# Patient Record
Sex: FEMALE | Race: WHITE | NOT HISPANIC OR LATINO
[De-identification: names, ages, dates, MRNs, and addresses within clinical notes are randomized per-mention and may not be internally consistent; named-entity substitution may affect disease eponyms.]

---

## 2017-01-25 ENCOUNTER — FORM ENCOUNTER (OUTPATIENT)
Age: 82
End: 2017-01-25

## 2017-01-26 ENCOUNTER — APPOINTMENT (OUTPATIENT)
Dept: ULTRASOUND IMAGING | Facility: CLINIC | Age: 82
End: 2017-01-26

## 2017-01-26 ENCOUNTER — OUTPATIENT (OUTPATIENT)
Dept: OUTPATIENT SERVICES | Facility: HOSPITAL | Age: 82
LOS: 1 days | End: 2017-01-26
Payer: MEDICARE

## 2017-01-26 ENCOUNTER — APPOINTMENT (OUTPATIENT)
Dept: INTERNAL MEDICINE | Facility: CLINIC | Age: 82
End: 2017-01-26

## 2017-01-26 VITALS — HEART RATE: 74 BPM | DIASTOLIC BLOOD PRESSURE: 70 MMHG | SYSTOLIC BLOOD PRESSURE: 124 MMHG

## 2017-01-26 VITALS — SYSTOLIC BLOOD PRESSURE: 136 MMHG | DIASTOLIC BLOOD PRESSURE: 76 MMHG | HEART RATE: 78 BPM

## 2017-01-26 VITALS
TEMPERATURE: 97.6 F | HEIGHT: 60 IN | SYSTOLIC BLOOD PRESSURE: 124 MMHG | DIASTOLIC BLOOD PRESSURE: 62 MMHG | BODY MASS INDEX: 38.09 KG/M2 | OXYGEN SATURATION: 97 % | HEART RATE: 78 BPM | WEIGHT: 194 LBS

## 2017-01-26 DIAGNOSIS — Z96.659 PRESENCE OF UNSPECIFIED ARTIFICIAL KNEE JOINT: Chronic | ICD-10-CM

## 2017-01-26 DIAGNOSIS — H26.9 UNSPECIFIED CATARACT: Chronic | ICD-10-CM

## 2017-01-26 DIAGNOSIS — Z96.60 PRESENCE OF UNSPECIFIED ORTHOPEDIC JOINT IMPLANT: Chronic | ICD-10-CM

## 2017-01-26 DIAGNOSIS — G56.00 CARPAL TUNNEL SYNDROME, UNSPECIFIED UPPER LIMB: Chronic | ICD-10-CM

## 2017-01-26 DIAGNOSIS — R60.9 EDEMA, UNSPECIFIED: ICD-10-CM

## 2017-01-26 PROCEDURE — 93971 EXTREMITY STUDY: CPT

## 2017-01-26 RX ORDER — MULTIVIT-MIN/FOLIC/VIT K/LYCOP 400-300MCG
25 MCG TABLET ORAL DAILY
Qty: 90 | Refills: 3 | Status: ACTIVE | COMMUNITY

## 2017-02-06 ENCOUNTER — MEDICATION RENEWAL (OUTPATIENT)
Age: 82
End: 2017-02-06

## 2017-04-28 ENCOUNTER — RX RENEWAL (OUTPATIENT)
Age: 82
End: 2017-04-28

## 2017-04-28 LAB
ALBUMIN SERPL ELPH-MCNC: 4.1 G/DL
ALP BLD-CCNC: 90 U/L
ALT SERPL-CCNC: 14 U/L
ANION GAP SERPL CALC-SCNC: 18 MMOL/L
AST SERPL-CCNC: 21 U/L
BASOPHILS # BLD AUTO: 0.06 K/UL
BASOPHILS NFR BLD AUTO: 1.1 %
BILIRUB SERPL-MCNC: 0.3 MG/DL
BUN SERPL-MCNC: 29 MG/DL
CALCIUM SERPL-MCNC: 10.2 MG/DL
CHLORIDE SERPL-SCNC: 99 MMOL/L
CO2 SERPL-SCNC: 23 MMOL/L
CREAT SERPL-MCNC: 0.96 MG/DL
EOSINOPHIL # BLD AUTO: 0.19 K/UL
EOSINOPHIL NFR BLD AUTO: 3.4 %
GLUCOSE SERPL-MCNC: 91 MG/DL
HCT VFR BLD CALC: 41.5 %
HGB BLD-MCNC: 13.1 G/DL
IMM GRANULOCYTES NFR BLD AUTO: 0.2 %
LYMPHOCYTES # BLD AUTO: 1.64 K/UL
LYMPHOCYTES NFR BLD AUTO: 29.7 %
MAN DIFF?: NORMAL
MCHC RBC-ENTMCNC: 29.3 PG
MCHC RBC-ENTMCNC: 31.6 GM/DL
MCV RBC AUTO: 92.8 FL
MONOCYTES # BLD AUTO: 0.52 K/UL
MONOCYTES NFR BLD AUTO: 9.4 %
NEUTROPHILS # BLD AUTO: 3.1 K/UL
NEUTROPHILS NFR BLD AUTO: 56.2 %
PLATELET # BLD AUTO: 332 K/UL
POTASSIUM SERPL-SCNC: 4 MMOL/L
PROT SERPL-MCNC: 7.2 G/DL
RBC # BLD: 4.47 M/UL
RBC # FLD: 14.3 %
SODIUM SERPL-SCNC: 140 MMOL/L
WBC # FLD AUTO: 5.52 K/UL

## 2017-05-03 ENCOUNTER — NON-APPOINTMENT (OUTPATIENT)
Age: 82
End: 2017-05-03

## 2017-05-03 ENCOUNTER — APPOINTMENT (OUTPATIENT)
Dept: CARDIOLOGY | Facility: CLINIC | Age: 82
End: 2017-05-03

## 2017-05-03 VITALS
TEMPERATURE: 97.5 F | HEART RATE: 84 BPM | SYSTOLIC BLOOD PRESSURE: 123 MMHG | DIASTOLIC BLOOD PRESSURE: 78 MMHG | WEIGHT: 196 LBS | BODY MASS INDEX: 38.48 KG/M2 | OXYGEN SATURATION: 96 % | HEIGHT: 60 IN

## 2017-05-05 LAB
ALBUMIN SERPL ELPH-MCNC: 4 G/DL
ALP BLD-CCNC: 88 U/L
ALT SERPL-CCNC: 17 U/L
ANION GAP SERPL CALC-SCNC: 18 MMOL/L
AST SERPL-CCNC: 24 U/L
BASOPHILS # BLD AUTO: 0.08 K/UL
BASOPHILS NFR BLD AUTO: 1.2 %
BILIRUB SERPL-MCNC: 0.4 MG/DL
BUN SERPL-MCNC: 34 MG/DL
CALCIUM SERPL-MCNC: 10 MG/DL
CHLORIDE SERPL-SCNC: 100 MMOL/L
CHOLEST SERPL-MCNC: 236 MG/DL
CHOLEST/HDLC SERPL: 2.6 RATIO
CK SERPL-CCNC: 205 U/L
CO2 SERPL-SCNC: 22 MMOL/L
CREAT SERPL-MCNC: 1.31 MG/DL
EOSINOPHIL # BLD AUTO: 0.22 K/UL
EOSINOPHIL NFR BLD AUTO: 3.3 %
GLUCOSE SERPL-MCNC: 96 MG/DL
HBA1C MFR BLD HPLC: 5.8 %
HCT VFR BLD CALC: 41 %
HDLC SERPL-MCNC: 90 MG/DL
HGB BLD-MCNC: 13.9 G/DL
IMM GRANULOCYTES NFR BLD AUTO: 0.2 %
LDLC SERPL CALC-MCNC: 133 MG/DL
LYMPHOCYTES # BLD AUTO: 1.95 K/UL
LYMPHOCYTES NFR BLD AUTO: 29.7 %
MAN DIFF?: NORMAL
MCHC RBC-ENTMCNC: 30 PG
MCHC RBC-ENTMCNC: 33.9 GM/DL
MCV RBC AUTO: 88.4 FL
MONOCYTES # BLD AUTO: 0.54 K/UL
MONOCYTES NFR BLD AUTO: 8.2 %
NEUTROPHILS # BLD AUTO: 3.77 K/UL
NEUTROPHILS NFR BLD AUTO: 57.4 %
NT-PROBNP SERPL-MCNC: 43 PG/ML
PLATELET # BLD AUTO: 282 K/UL
POTASSIUM SERPL-SCNC: 3.6 MMOL/L
PROT SERPL-MCNC: 7.3 G/DL
RBC # BLD: 4.64 M/UL
RBC # FLD: 17.6 %
SODIUM SERPL-SCNC: 140 MMOL/L
TRIGL SERPL-MCNC: 65 MG/DL
WBC # FLD AUTO: 6.57 K/UL

## 2017-06-05 ENCOUNTER — APPOINTMENT (OUTPATIENT)
Dept: MRI IMAGING | Facility: CLINIC | Age: 82
End: 2017-06-05

## 2017-06-05 ENCOUNTER — OUTPATIENT (OUTPATIENT)
Dept: OUTPATIENT SERVICES | Facility: HOSPITAL | Age: 82
LOS: 1 days | End: 2017-06-05
Payer: MEDICARE

## 2017-06-05 DIAGNOSIS — Z00.8 ENCOUNTER FOR OTHER GENERAL EXAMINATION: ICD-10-CM

## 2017-06-05 DIAGNOSIS — G56.00 CARPAL TUNNEL SYNDROME, UNSPECIFIED UPPER LIMB: Chronic | ICD-10-CM

## 2017-06-05 DIAGNOSIS — Z96.659 PRESENCE OF UNSPECIFIED ARTIFICIAL KNEE JOINT: Chronic | ICD-10-CM

## 2017-06-05 DIAGNOSIS — H26.9 UNSPECIFIED CATARACT: Chronic | ICD-10-CM

## 2017-06-05 DIAGNOSIS — Z96.60 PRESENCE OF UNSPECIFIED ORTHOPEDIC JOINT IMPLANT: Chronic | ICD-10-CM

## 2017-06-05 PROCEDURE — 74183 MRI ABD W/O CNTR FLWD CNTR: CPT

## 2017-06-05 PROCEDURE — A9585: CPT

## 2017-06-28 ENCOUNTER — APPOINTMENT (OUTPATIENT)
Dept: OTOLARYNGOLOGY | Facility: CLINIC | Age: 82
End: 2017-06-28

## 2017-06-28 VITALS
HEIGHT: 62 IN | BODY MASS INDEX: 34.96 KG/M2 | WEIGHT: 190 LBS | DIASTOLIC BLOOD PRESSURE: 76 MMHG | HEART RATE: 93 BPM | SYSTOLIC BLOOD PRESSURE: 123 MMHG

## 2017-07-12 ENCOUNTER — MEDICATION RENEWAL (OUTPATIENT)
Age: 82
End: 2017-07-12

## 2017-08-08 ENCOUNTER — MEDICATION RENEWAL (OUTPATIENT)
Age: 82
End: 2017-08-08

## 2017-09-13 ENCOUNTER — APPOINTMENT (OUTPATIENT)
Dept: CARDIOLOGY | Facility: CLINIC | Age: 82
End: 2017-09-13
Payer: MEDICARE

## 2017-09-13 ENCOUNTER — NON-APPOINTMENT (OUTPATIENT)
Age: 82
End: 2017-09-13

## 2017-09-13 VITALS
DIASTOLIC BLOOD PRESSURE: 73 MMHG | WEIGHT: 205 LBS | HEART RATE: 84 BPM | SYSTOLIC BLOOD PRESSURE: 121 MMHG | BODY MASS INDEX: 37.5 KG/M2 | OXYGEN SATURATION: 95 %

## 2017-09-13 DIAGNOSIS — R73.09 OTHER ABNORMAL GLUCOSE: ICD-10-CM

## 2017-09-13 PROCEDURE — 99214 OFFICE O/P EST MOD 30 MIN: CPT

## 2017-09-13 PROCEDURE — 36415 COLL VENOUS BLD VENIPUNCTURE: CPT

## 2017-09-13 PROCEDURE — 93000 ELECTROCARDIOGRAM COMPLETE: CPT

## 2017-09-15 ENCOUNTER — RX RENEWAL (OUTPATIENT)
Age: 82
End: 2017-09-15

## 2017-09-15 LAB
25(OH)D3 SERPL-MCNC: 35.6 NG/ML
ALBUMIN SERPL ELPH-MCNC: 3.8 G/DL
ALP BLD-CCNC: 91 U/L
ALT SERPL-CCNC: 15 U/L
ANION GAP SERPL CALC-SCNC: 23 MMOL/L
AST SERPL-CCNC: 24 U/L
BASOPHILS # BLD AUTO: 0.05 K/UL
BASOPHILS NFR BLD AUTO: 0.7 %
BILIRUB SERPL-MCNC: 0.3 MG/DL
BUN SERPL-MCNC: 40 MG/DL
CALCIUM SERPL-MCNC: 9.9 MG/DL
CHLORIDE SERPL-SCNC: 101 MMOL/L
CHOLEST SERPL-MCNC: 214 MG/DL
CHOLEST/HDLC SERPL: 3.2 RATIO
CO2 SERPL-SCNC: 18 MMOL/L
CREAT SERPL-MCNC: 1.01 MG/DL
EOSINOPHIL # BLD AUTO: 0.26 K/UL
EOSINOPHIL NFR BLD AUTO: 3.6 %
GLUCOSE SERPL-MCNC: 102 MG/DL
HBA1C MFR BLD HPLC: 5.6 %
HCT VFR BLD CALC: 40.1 %
HDLC SERPL-MCNC: 66 MG/DL
HGB BLD-MCNC: 13.2 G/DL
IMM GRANULOCYTES NFR BLD AUTO: 0.1 %
LDLC SERPL CALC-MCNC: 131 MG/DL
LYMPHOCYTES # BLD AUTO: 1.66 K/UL
LYMPHOCYTES NFR BLD AUTO: 22.7 %
MAN DIFF?: NORMAL
MCHC RBC-ENTMCNC: 30 PG
MCHC RBC-ENTMCNC: 32.9 GM/DL
MCV RBC AUTO: 91.1 FL
MONOCYTES # BLD AUTO: 0.78 K/UL
MONOCYTES NFR BLD AUTO: 10.7 %
NEUTROPHILS # BLD AUTO: 4.55 K/UL
NEUTROPHILS NFR BLD AUTO: 62.2 %
NT-PROBNP SERPL-MCNC: 37 PG/ML
PLATELET # BLD AUTO: 287 K/UL
POTASSIUM SERPL-SCNC: 3.5 MMOL/L
PROT SERPL-MCNC: 6.9 G/DL
RBC # BLD: 4.4 M/UL
RBC # FLD: 14.7 %
SODIUM SERPL-SCNC: 142 MMOL/L
TRIGL SERPL-MCNC: 85 MG/DL
TSH SERPL-ACNC: 1.86 UIU/ML
WBC # FLD AUTO: 7.31 K/UL

## 2017-11-06 ENCOUNTER — APPOINTMENT (OUTPATIENT)
Dept: INTERNAL MEDICINE | Facility: CLINIC | Age: 82
End: 2017-11-06
Payer: MEDICARE

## 2017-11-06 VITALS
TEMPERATURE: 98.2 F | HEIGHT: 62 IN | BODY MASS INDEX: 37.13 KG/M2 | OXYGEN SATURATION: 98 % | HEART RATE: 88 BPM | WEIGHT: 201.8 LBS | SYSTOLIC BLOOD PRESSURE: 120 MMHG | DIASTOLIC BLOOD PRESSURE: 60 MMHG

## 2017-11-06 PROCEDURE — G0439: CPT

## 2017-11-06 RX ORDER — AMOXICILLIN 500 MG/1
500 CAPSULE ORAL
Qty: 28 | Refills: 0 | Status: COMPLETED | COMMUNITY
Start: 2017-01-03 | End: 2017-11-06

## 2017-12-06 ENCOUNTER — APPOINTMENT (OUTPATIENT)
Dept: INTERNAL MEDICINE | Facility: CLINIC | Age: 82
End: 2017-12-06
Payer: MEDICARE

## 2017-12-06 ENCOUNTER — NON-APPOINTMENT (OUTPATIENT)
Age: 82
End: 2017-12-06

## 2017-12-06 VITALS
TEMPERATURE: 98.5 F | BODY MASS INDEX: 36.99 KG/M2 | RESPIRATION RATE: 14 BRPM | SYSTOLIC BLOOD PRESSURE: 128 MMHG | WEIGHT: 201 LBS | HEART RATE: 95 BPM | HEIGHT: 62 IN | OXYGEN SATURATION: 96 % | DIASTOLIC BLOOD PRESSURE: 70 MMHG

## 2017-12-06 DIAGNOSIS — R07.9 CHEST PAIN, UNSPECIFIED: ICD-10-CM

## 2017-12-06 LAB
APPEARANCE: ABNORMAL
BACTERIA: ABNORMAL
BILIRUBIN URINE: NEGATIVE
BLOOD URINE: NEGATIVE
COLOR: YELLOW
GLUCOSE QUALITATIVE U: NEGATIVE MG/DL
HYALINE CASTS: 0 /LPF
KETONES URINE: ABNORMAL
LEUKOCYTE ESTERASE URINE: ABNORMAL
MICROSCOPIC-UA: NORMAL
NITRITE URINE: POSITIVE
PH URINE: 7.5
PROTEIN URINE: NEGATIVE MG/DL
RED BLOOD CELLS URINE: 3 /HPF
SPECIFIC GRAVITY URINE: 1.03
SQUAMOUS EPITHELIAL CELLS: >27 /HPF
UROBILINOGEN URINE: 1 MG/DL
WHITE BLOOD CELLS URINE: 12 /HPF

## 2017-12-06 PROCEDURE — 99214 OFFICE O/P EST MOD 30 MIN: CPT

## 2017-12-06 PROCEDURE — 93000 ELECTROCARDIOGRAM COMPLETE: CPT

## 2017-12-07 LAB — RAPID RVP RESULT: NOT DETECTED

## 2017-12-11 ENCOUNTER — MESSAGE (OUTPATIENT)
Age: 82
End: 2017-12-11

## 2017-12-26 ENCOUNTER — MESSAGE (OUTPATIENT)
Age: 82
End: 2017-12-26

## 2018-01-02 ENCOUNTER — APPOINTMENT (OUTPATIENT)
Dept: INTERNAL MEDICINE | Facility: CLINIC | Age: 83
End: 2018-01-02

## 2018-03-12 ENCOUNTER — NON-APPOINTMENT (OUTPATIENT)
Age: 83
End: 2018-03-12

## 2018-03-12 ENCOUNTER — APPOINTMENT (OUTPATIENT)
Dept: CARDIOLOGY | Facility: CLINIC | Age: 83
End: 2018-03-12
Payer: MEDICARE

## 2018-03-12 VITALS
TEMPERATURE: 97.8 F | OXYGEN SATURATION: 96 % | WEIGHT: 209 LBS | HEART RATE: 87 BPM | SYSTOLIC BLOOD PRESSURE: 152 MMHG | HEIGHT: 62 IN | BODY MASS INDEX: 38.46 KG/M2 | DIASTOLIC BLOOD PRESSURE: 83 MMHG

## 2018-03-12 VITALS — SYSTOLIC BLOOD PRESSURE: 128 MMHG | DIASTOLIC BLOOD PRESSURE: 66 MMHG | HEART RATE: 80 BPM

## 2018-03-12 PROCEDURE — 93000 ELECTROCARDIOGRAM COMPLETE: CPT

## 2018-03-12 PROCEDURE — 99214 OFFICE O/P EST MOD 30 MIN: CPT

## 2018-03-13 ENCOUNTER — NON-APPOINTMENT (OUTPATIENT)
Age: 83
End: 2018-03-13

## 2018-06-12 ENCOUNTER — RX RENEWAL (OUTPATIENT)
Age: 83
End: 2018-06-12

## 2018-08-06 ENCOUNTER — RX RENEWAL (OUTPATIENT)
Age: 83
End: 2018-08-06

## 2018-08-16 ENCOUNTER — APPOINTMENT (OUTPATIENT)
Dept: OTOLARYNGOLOGY | Facility: CLINIC | Age: 83
End: 2018-08-16
Payer: MEDICARE

## 2018-08-16 VITALS
DIASTOLIC BLOOD PRESSURE: 71 MMHG | SYSTOLIC BLOOD PRESSURE: 138 MMHG | BODY MASS INDEX: 37.54 KG/M2 | WEIGHT: 204 LBS | HEIGHT: 62 IN | HEART RATE: 80 BPM

## 2018-08-16 PROCEDURE — 69210 REMOVE IMPACTED EAR WAX UNI: CPT

## 2018-08-16 PROCEDURE — 99213 OFFICE O/P EST LOW 20 MIN: CPT | Mod: 25

## 2018-10-08 ENCOUNTER — APPOINTMENT (OUTPATIENT)
Dept: CARDIOLOGY | Facility: CLINIC | Age: 83
End: 2018-10-08
Payer: MEDICARE

## 2018-10-08 ENCOUNTER — NON-APPOINTMENT (OUTPATIENT)
Age: 83
End: 2018-10-08

## 2018-10-08 VITALS
DIASTOLIC BLOOD PRESSURE: 70 MMHG | SYSTOLIC BLOOD PRESSURE: 117 MMHG | HEART RATE: 92 BPM | TEMPERATURE: 98.3 F | BODY MASS INDEX: 37.54 KG/M2 | WEIGHT: 204 LBS | HEIGHT: 62 IN | OXYGEN SATURATION: 92 %

## 2018-10-08 PROCEDURE — 99214 OFFICE O/P EST MOD 30 MIN: CPT

## 2018-10-08 PROCEDURE — 93000 ELECTROCARDIOGRAM COMPLETE: CPT

## 2018-11-19 ENCOUNTER — APPOINTMENT (OUTPATIENT)
Dept: INTERNAL MEDICINE | Facility: CLINIC | Age: 83
End: 2018-11-19
Payer: MEDICARE

## 2018-11-19 ENCOUNTER — RESULT REVIEW (OUTPATIENT)
Age: 83
End: 2018-11-19

## 2018-11-19 DIAGNOSIS — Z23 ENCOUNTER FOR IMMUNIZATION: ICD-10-CM

## 2018-11-19 PROCEDURE — G0444 DEPRESSION SCREEN ANNUAL: CPT | Mod: 59

## 2018-11-19 PROCEDURE — 36415 COLL VENOUS BLD VENIPUNCTURE: CPT

## 2018-11-19 PROCEDURE — 90732 PPSV23 VACC 2 YRS+ SUBQ/IM: CPT

## 2018-11-19 PROCEDURE — G0439: CPT

## 2018-11-19 PROCEDURE — 99213 OFFICE O/P EST LOW 20 MIN: CPT | Mod: 25

## 2018-11-19 PROCEDURE — G0009: CPT

## 2018-11-26 ENCOUNTER — APPOINTMENT (OUTPATIENT)
Dept: MRI IMAGING | Facility: CLINIC | Age: 83
End: 2018-11-26

## 2018-11-28 LAB
25(OH)D3 SERPL-MCNC: 37.2 NG/ML
ALBUMIN SERPL ELPH-MCNC: 4.6 G/DL
ALP BLD-CCNC: 90 U/L
ALT SERPL-CCNC: 17 U/L
ANION GAP SERPL CALC-SCNC: 18 MMOL/L
APPEARANCE: ABNORMAL
AST SERPL-CCNC: 21 U/L
BACTERIA: ABNORMAL
BASOPHILS # BLD AUTO: 0.06 K/UL
BASOPHILS NFR BLD AUTO: 0.6 %
BILIRUB SERPL-MCNC: 0.3 MG/DL
BILIRUBIN URINE: NEGATIVE
BLOOD URINE: ABNORMAL
BUN SERPL-MCNC: 24 MG/DL
CALCIUM SERPL-MCNC: 10.4 MG/DL
CHLORIDE SERPL-SCNC: 102 MMOL/L
CHOLEST SERPL-MCNC: 243 MG/DL
CHOLEST/HDLC SERPL: 3.5 RATIO
CO2 SERPL-SCNC: 23 MMOL/L
COLOR: YELLOW
CREAT SERPL-MCNC: 0.85 MG/DL
EOSINOPHIL # BLD AUTO: 0.21 K/UL
EOSINOPHIL NFR BLD AUTO: 2.2 %
GLUCOSE QUALITATIVE U: NEGATIVE MG/DL
GLUCOSE SERPL-MCNC: 107 MG/DL
HBA1C MFR BLD HPLC: 5.8 %
HCT VFR BLD CALC: 43.5 %
HDLC SERPL-MCNC: 70 MG/DL
HGB BLD-MCNC: 14.6 G/DL
HYALINE CASTS: 7 /LPF
IMM GRANULOCYTES NFR BLD AUTO: 0.2 %
KETONES URINE: ABNORMAL
LDLC SERPL CALC-MCNC: 136 MG/DL
LEUKOCYTE ESTERASE URINE: ABNORMAL
LYMPHOCYTES # BLD AUTO: 1.96 K/UL
LYMPHOCYTES NFR BLD AUTO: 20.8 %
MAN DIFF?: NORMAL
MCHC RBC-ENTMCNC: 30.6 PG
MCHC RBC-ENTMCNC: 33.6 GM/DL
MCV RBC AUTO: 91.2 FL
MICROSCOPIC-UA: NORMAL
MONOCYTES # BLD AUTO: 0.9 K/UL
MONOCYTES NFR BLD AUTO: 9.6 %
NEUTROPHILS # BLD AUTO: 6.26 K/UL
NEUTROPHILS NFR BLD AUTO: 66.6 %
NITRITE URINE: POSITIVE
PH URINE: 7
PLATELET # BLD AUTO: 316 K/UL
POTASSIUM SERPL-SCNC: 4 MMOL/L
PROT SERPL-MCNC: 7.2 G/DL
PROTEIN URINE: NEGATIVE MG/DL
RBC # BLD: 4.77 M/UL
RBC # FLD: 14.2 %
RED BLOOD CELLS URINE: 7 /HPF
SODIUM SERPL-SCNC: 143 MMOL/L
SPECIFIC GRAVITY URINE: 1.02
SQUAMOUS EPITHELIAL CELLS: 4 /HPF
T4 FREE SERPL-MCNC: 1.3 NG/DL
TRIGL SERPL-MCNC: 184 MG/DL
TSH SERPL-ACNC: 2 UIU/ML
UROBILINOGEN URINE: NEGATIVE MG/DL
WBC # FLD AUTO: 9.41 K/UL
WHITE BLOOD CELLS URINE: 42 /HPF

## 2018-11-29 NOTE — ADDENDUM
[FreeTextEntry1] : 11/28/18 - Spoke with patient at 8:30AM.  A1c 5.8%.  Lipids stable.  Urine has chronic findings - would treat if patient develops symptoms.  Other labs WNL.

## 2018-11-29 NOTE — HEALTH RISK ASSESSMENT
[Good] : ~his/her~  mood as  good [No falls in past year] : Patient reported no falls in the past year [0] : 2) Feeling down, depressed, or hopeless: Not at all (0) [Patient reported bone density results were abnormal] : Patient reported bone density results were abnormal [HIV test declined] : HIV test declined [Hepatitis C test declined] : Hepatitis C test declined [None] : None [With Family] : lives with family [] :  [Sexually Active] : sexually active [Feels Safe at Home] : Feels safe at home [Fully functional (bathing, dressing, toileting, transferring, walking, feeding)] : Fully functional (bathing, dressing, toileting, transferring, walking, feeding) [Fully functional (using the telephone, shopping, preparing meals, housekeeping, doing laundry, using] : Fully functional and needs no help or supervision to perform IADLs (using the telephone, shopping, preparing meals, housekeeping, doing laundry, using transportation, managing medications and managing finances) [Smoke Detector] : smoke detector [Carbon Monoxide Detector] : carbon monoxide detector [Seat Belt] :  uses seat belt [Sunscreen] : uses sunscreen [Discussed at today's visit] : Advance Directives Discussed at today's visit [Retired] : retired [Reports changes in hearing] : Reports changes in hearing [] : No [de-identified] : Former smoker.  Smoked from ages 18 to 30, 2ppd [[id number]] : 0 [Change in mental status noted] : No change in mental status noted [Language] : denies difficulty with language [Behavior] : denies difficulty with behavior [Handling Complex Tasks] : denies difficulty handling complex tasks [Reasoning] : denies difficulty with reasoning [High Risk Behavior] : no high risk behavior [Reports changes in vision] : Reports no changes in vision [Reports changes in dental health] : Reports no changes in dental health [MammogramDate] : 01/20/2016 [MammogramComments] : Age >75 [PapSmearComments] : Age >75 [BoneDensityDate] : 11/09/2016 [BoneDensityComments] : Osteopenia  -1.7 TH, -1.5 FN.  Spine, wrist - normal. [ColonoscopyDate] : 2018 [ColonoscopyComments] : Age >75.  Dr. Dejan Vogel. 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 [de-identified] : Drives a car. [de-identified] : Hearing aids. [de-identified] : Glasses (distance glasses, OTC readers).  Dr. Ramirez (ophthalmology). [de-identified] : Going regularly. [FreeTextEntry4] : In the process of preparing a Health Care Proxy, and will forward a copy to me.

## 2018-11-29 NOTE — ASSESSMENT
[FreeTextEntry1] : (1) HCM - discussed diet, exercise, weight maintenance.  Labs ordered as below.  ECG recently done by Dr. Arora and deferred today.  Patient has received the influenza vaccination this season.  Patient UTD on Prevnar (10/14/15).  Pneumovax given today.  Tdap and Shingrix declined.  Influenza vaccination has been given this season.  Patient had a normal mammogram in 2016.  Patient advised that the mammograms can be discontinued after age 75, but she can still go if she wishes.  She continues to see Gyn (Dr. Ca) every year or every other year.  Script for bone density given today.  Patient says she was advised by Dr. Vogel to continue colonoscopies to 86.  Patient will mail a copy of her Health Care Proxy.\par \par (2) CV - continue present anti-hypertensive and anti-anginal regimen per Dr. Arora.  Blood pressure has been adequate.  She continues to have high cholesterol, and did not tolerate any of the statins or Zetia.  She does not wish to try other alternatives at this time.\par \par (3) Ortho - s/p THR with Dr. Severiano Bills.  Patient not requiring pain medication.\par \par (4) Prediabetes - patient's A1c improved to 5.6% this year.  I again reviewed with patient the continuing need to avoid sweets, and to watch out for large carbohydrate servings (prior A1c in the low 6's).\par \par (5) Derm - patient says she gets occasional rashes under her breasts and in a fold in her stomach (intertrigo).  She feels that it responds best to the steroid and not to antifungal agents.  Refill of fluocinonide given today.  Currently, the rash is small, but she will call if it does not respond to the steroid.\par \par (6) GI - patient describes intermittent rectal bleeding when her stools are hard, and this improves when she has more bran.  She plans to follow-up with Dr. Vogel for this.  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 Alberto (although she will continue follow-up with Dr. Vogel).  An MRI was done in August 2016, and 1 year follow-up was recommended.

## 2018-11-29 NOTE — HISTORY OF PRESENT ILLNESS
[de-identified] : Patient presents for a follow-up annual physical.\par \par Patient can walk on a treadmill for 5 minutes, and 1 mile on the exercise bicycle daily.\par \par Patient is seeing Dr. Rebolledo ([facility]) for low back pain and left hip pain.  She is getting physical therapy.  An MRI is scheduled.  She may get steroid injections again.\par \par Patient says that the bottom of the feet turn red, and there is pain.  No bleeding or broken skin.  She gets cold extremities.  She declines seeing a rheumatologist.

## 2018-12-21 ENCOUNTER — MESSAGE (OUTPATIENT)
Age: 83
End: 2018-12-21

## 2019-01-03 ENCOUNTER — MESSAGE (OUTPATIENT)
Age: 84
End: 2019-01-03

## 2019-01-03 DIAGNOSIS — Z87.39 PERSONAL HISTORY OF OTHER DISEASES OF THE MUSCULOSKELETAL SYSTEM AND CONNECTIVE TISSUE: ICD-10-CM

## 2019-01-07 ENCOUNTER — MESSAGE (OUTPATIENT)
Age: 84
End: 2019-01-07

## 2019-01-07 ENCOUNTER — APPOINTMENT (OUTPATIENT)
Dept: INTERNAL MEDICINE | Facility: CLINIC | Age: 84
End: 2019-01-07
Payer: MEDICARE

## 2019-01-07 PROCEDURE — 36415 COLL VENOUS BLD VENIPUNCTURE: CPT

## 2019-04-04 ENCOUNTER — APPOINTMENT (OUTPATIENT)
Dept: OTOLARYNGOLOGY | Facility: CLINIC | Age: 84
End: 2019-04-04
Payer: MEDICARE

## 2019-04-04 VITALS
HEART RATE: 80 BPM | SYSTOLIC BLOOD PRESSURE: 133 MMHG | BODY MASS INDEX: 34.78 KG/M2 | HEIGHT: 62 IN | DIASTOLIC BLOOD PRESSURE: 74 MMHG | WEIGHT: 189 LBS

## 2019-04-04 DIAGNOSIS — H81.93 UNSPECIFIED DISORDER OF VESTIBULAR FUNCTION, BILATERAL: ICD-10-CM

## 2019-04-04 PROCEDURE — 69210 REMOVE IMPACTED EAR WAX UNI: CPT

## 2019-04-04 PROCEDURE — 99213 OFFICE O/P EST LOW 20 MIN: CPT | Mod: 25

## 2019-04-04 NOTE — REASON FOR VISIT
[Subsequent Evaluation] : a subsequent evaluation for [Hearing Loss] : hearing loss [Vertigo] : vertigo

## 2019-04-04 NOTE — ASSESSMENT
[FreeTextEntry1] : After informed verbal consent is obtained, cerumen is removed from the right and left  ear canal with a curette and suction.\par Rec: \par Debrox is to be placed in both ears on a routine basis to keep them free of wax.\par Routine debridement suggested to keep the ears free of wax.\par \par imbalance-vestib rehab\par \par bilateral sensorineural hearing loss-cleared for hearing aids\par

## 2019-04-04 NOTE — PHYSICAL EXAM
[Midline] : trachea located in midline position [Hearing Loss Right Only] : normal [Hearing Loss Left Only] : normal [Nystagmus] : ~T no ~M nystagmus was seen [Fistula Sign] : Fistula Sign: Negative [FreeTextEntry8] : wax [FreeTextEntry9] : wax [de-identified] : could not perform fukuda due to leg raising difficilty [Normal] : salivary glands are normal

## 2019-04-04 NOTE — HISTORY OF PRESENT ILLNESS
[No] : patient does not have a  history of radiation therapy [Hearing Loss] : hearing loss [Presbycusis] : presbycusis [None] : No risk factors have been identified. [de-identified] : 83 yo\par \par History of bilateral hearing loss and cerumen impaction and wears hearing aids\par needs new aids and is here for cleaning\par no other modifying factors\par no nasal or throat complaints\par

## 2019-05-20 ENCOUNTER — APPOINTMENT (OUTPATIENT)
Dept: INTERNAL MEDICINE | Facility: CLINIC | Age: 84
End: 2019-05-20
Payer: MEDICARE

## 2019-05-20 VITALS
BODY MASS INDEX: 36.8 KG/M2 | HEART RATE: 93 BPM | TEMPERATURE: 98.8 F | DIASTOLIC BLOOD PRESSURE: 60 MMHG | HEIGHT: 62 IN | OXYGEN SATURATION: 96 % | SYSTOLIC BLOOD PRESSURE: 124 MMHG | WEIGHT: 200 LBS

## 2019-05-20 DIAGNOSIS — J06.9 ACUTE UPPER RESPIRATORY INFECTION, UNSPECIFIED: ICD-10-CM

## 2019-05-20 PROCEDURE — 99213 OFFICE O/P EST LOW 20 MIN: CPT

## 2019-05-20 NOTE — PHYSICAL EXAM
[No Acute Distress] : no acute distress [Well Nourished] : well nourished [Well Developed] : well developed [Normal Oropharynx] : the oropharynx was normal [Normal TMs] : both tympanic membranes were normal [Supple] : supple [No Respiratory Distress] : no respiratory distress  [Clear to Auscultation] : lungs were clear to auscultation bilaterally [No Accessory Muscle Use] : no accessory muscle use [Normal Rate] : normal rate  [Regular Rhythm] : with a regular rhythm [Normal S1, S2] : normal S1 and S2 [No Murmur] : no murmur heard [No Edema] : there was no peripheral edema [Soft] : abdomen soft [Non Tender] : non-tender [No Rash] : no rash [Normal Mood] : the mood was normal [de-identified] : tender enlarged parotid LN adjacent to right ear lobe  [de-identified] : using cane

## 2019-05-20 NOTE — ASSESSMENT
[FreeTextEntry1] : Patient likely has a viral URI.\par \par No need for antibiotics at this time. Start trial of Promethazine-DM. Rest, fluids, Tylenol PRN. \par \par Tender right parotid lymph node secondary to viral syndrome, reassurance given. \par \par Call office PRN with any worsening sx or other concerns.

## 2019-05-20 NOTE — HISTORY OF PRESENT ILLNESS
[FreeTextEntry8] : Patient comes for an acute visit. \par \par She is here for evaluation of swollen and tender neck glands since this past Friday. She also complains of a cough with associated rib pain. Cough productive of yellow-green sputum. She had a low grade fever of 100F on Saturday. She has an intermittent sore throat. Her voice was hoarse earlier today but has dissipated. Taking OTC medications including tylenol, mucinex, and benadryl. Her neck glands are less swollen although still has pain near right ear. Her  is currently ill with URI sx. No recent travel.

## 2019-07-11 LAB
ALBUMIN SERPL ELPH-MCNC: 4.2 G/DL
ALP BLD-CCNC: 90 U/L
ALT SERPL-CCNC: 16 U/L
ANION GAP SERPL CALC-SCNC: 13 MMOL/L
AST SERPL-CCNC: 23 U/L
BASOPHILS # BLD AUTO: 0.06 K/UL
BASOPHILS NFR BLD AUTO: 0.6 %
BILIRUB SERPL-MCNC: 0.3 MG/DL
BUN SERPL-MCNC: 33 MG/DL
CALCIUM SERPL-MCNC: 10.2 MG/DL
CANCER AG19-9 SERPL-ACNC: 26.3 U/ML
CHLORIDE SERPL-SCNC: 100 MMOL/L
CO2 SERPL-SCNC: 27 MMOL/L
CREAT SERPL-MCNC: 0.92 MG/DL
EOSINOPHIL # BLD AUTO: 0.23 K/UL
EOSINOPHIL NFR BLD AUTO: 2.4 %
GLUCOSE SERPL-MCNC: 108 MG/DL
HCT VFR BLD CALC: 44.9 %
HGB BLD-MCNC: 14.7 G/DL
IMM GRANULOCYTES NFR BLD AUTO: 0.2 %
LYMPHOCYTES # BLD AUTO: 1.65 K/UL
LYMPHOCYTES NFR BLD AUTO: 17.6 %
MAN DIFF?: NORMAL
MCHC RBC-ENTMCNC: 30.8 PG
MCHC RBC-ENTMCNC: 32.7 GM/DL
MCV RBC AUTO: 94.1 FL
MONOCYTES # BLD AUTO: 0.79 K/UL
MONOCYTES NFR BLD AUTO: 8.4 %
NEUTROPHILS # BLD AUTO: 6.65 K/UL
NEUTROPHILS NFR BLD AUTO: 70.8 %
PLATELET # BLD AUTO: 282 K/UL
POTASSIUM SERPL-SCNC: 3.6 MMOL/L
PROT SERPL-MCNC: 7.4 G/DL
RBC # BLD: 4.77 M/UL
RBC # FLD: 14.8 %
SODIUM SERPL-SCNC: 140 MMOL/L
WBC # FLD AUTO: 9.4 K/UL

## 2019-08-23 ENCOUNTER — RX RENEWAL (OUTPATIENT)
Age: 84
End: 2019-08-23

## 2019-09-23 ENCOUNTER — MEDICATION RENEWAL (OUTPATIENT)
Age: 84
End: 2019-09-23

## 2019-10-30 ENCOUNTER — APPOINTMENT (OUTPATIENT)
Dept: INTERNAL MEDICINE | Facility: CLINIC | Age: 84
End: 2019-10-30
Payer: MEDICARE

## 2019-10-30 VITALS
TEMPERATURE: 98 F | DIASTOLIC BLOOD PRESSURE: 60 MMHG | HEIGHT: 62 IN | WEIGHT: 210 LBS | OXYGEN SATURATION: 95 % | SYSTOLIC BLOOD PRESSURE: 126 MMHG | HEART RATE: 90 BPM | BODY MASS INDEX: 38.64 KG/M2

## 2019-10-30 PROCEDURE — G0008: CPT

## 2019-10-30 PROCEDURE — 90653 IIV ADJUVANT VACCINE IM: CPT

## 2019-10-30 PROCEDURE — 99214 OFFICE O/P EST MOD 30 MIN: CPT | Mod: 25

## 2019-10-30 PROCEDURE — 36415 COLL VENOUS BLD VENIPUNCTURE: CPT

## 2019-11-04 LAB
25(OH)D3 SERPL-MCNC: 29.5 NG/ML
ALBUMIN SERPL ELPH-MCNC: 4.3 G/DL
ALP BLD-CCNC: 90 U/L
ALT SERPL-CCNC: 17 U/L
ANION GAP SERPL CALC-SCNC: 16 MMOL/L
APPEARANCE: CLEAR
AST SERPL-CCNC: 23 U/L
BACTERIA: ABNORMAL
BASOPHILS # BLD AUTO: 0.08 K/UL
BASOPHILS NFR BLD AUTO: 1.1 %
BILIRUB SERPL-MCNC: 0.3 MG/DL
BILIRUBIN URINE: NEGATIVE
BLOOD URINE: ABNORMAL
BUN SERPL-MCNC: 30 MG/DL
CALCIUM SERPL-MCNC: 9.7 MG/DL
CHLORIDE SERPL-SCNC: 104 MMOL/L
CO2 SERPL-SCNC: 22 MMOL/L
COLOR: YELLOW
CREAT SERPL-MCNC: 0.79 MG/DL
EOSINOPHIL # BLD AUTO: 0.18 K/UL
EOSINOPHIL NFR BLD AUTO: 2.5 %
ESTIMATED AVERAGE GLUCOSE: 117 MG/DL
GLUCOSE QUALITATIVE U: NEGATIVE
GLUCOSE SERPL-MCNC: 134 MG/DL
HBA1C MFR BLD HPLC: 5.7 %
HCT VFR BLD CALC: 43.5 %
HDLC SERPL-MCNC: 76 MG/DL
HGB BLD-MCNC: 14.3 G/DL
HYALINE CASTS: 2 /LPF
IMM GRANULOCYTES NFR BLD AUTO: 0.3 %
KETONES URINE: NEGATIVE
LDLC SERPL DIRECT ASSAY-MCNC: 153 MG/DL
LEUKOCYTE ESTERASE URINE: ABNORMAL
LYMPHOCYTES # BLD AUTO: 1.53 K/UL
LYMPHOCYTES NFR BLD AUTO: 21.4 %
MAN DIFF?: NORMAL
MCHC RBC-ENTMCNC: 31.2 PG
MCHC RBC-ENTMCNC: 32.9 GM/DL
MCV RBC AUTO: 94.8 FL
MICROSCOPIC-UA: NORMAL
MONOCYTES # BLD AUTO: 0.74 K/UL
MONOCYTES NFR BLD AUTO: 10.3 %
NEUTROPHILS # BLD AUTO: 4.6 K/UL
NEUTROPHILS NFR BLD AUTO: 64.4 %
NITRITE URINE: POSITIVE
PH URINE: 6.5
PLATELET # BLD AUTO: 292 K/UL
POTASSIUM SERPL-SCNC: 3.6 MMOL/L
PROT SERPL-MCNC: 6.9 G/DL
PROTEIN URINE: NORMAL
RBC # BLD: 4.59 M/UL
RBC # FLD: 14 %
RED BLOOD CELLS URINE: 3 /HPF
SODIUM SERPL-SCNC: 142 MMOL/L
SPECIFIC GRAVITY URINE: 1.02
SQUAMOUS EPITHELIAL CELLS: 5 /HPF
T4 FREE SERPL-MCNC: 1.1 NG/DL
TSH SERPL-ACNC: 2.33 UIU/ML
UROBILINOGEN URINE: NORMAL
WBC # FLD AUTO: 7.15 K/UL
WHITE BLOOD CELLS URINE: 46 /HPF

## 2019-11-04 NOTE — HISTORY OF PRESENT ILLNESS
[de-identified] : Patient presents for general follow-up.  Patient remains on Dyazide for hypertension, and is on 1 potassium tablet daily (previously was on 2).  She has no headache, dizziness, or lightheadedness.  She is not on lipid medication, and has not tolerated statins in the past.  She declined alternatives.  She has otherwise been well, although she had a nasal drip when lying down.  She used Mucinex (but no antihistamines or nasal steroid sprays).

## 2019-11-04 NOTE — ASSESSMENT
[FreeTextEntry1] : CV - patient BP is good on the Dyazide.  She is on 1 tablet of KCL 20meq daily, and will check lytes today.  Patient declines further attempts at lipid medication.  She can follow-up with Dr. Arora as directed.\par \par Nasal drip - patient using Mucinex.  I suggested Claritin and/or Flonase, although she declines these for now.\par \par Follow-up with Dr. Vogel for IPMN.\par \par Influenza vaccination given today.  Follow-up for annual physical.  Labs drawn in office as below.

## 2019-12-26 ENCOUNTER — RX RENEWAL (OUTPATIENT)
Age: 84
End: 2019-12-26

## 2020-02-05 ENCOUNTER — APPOINTMENT (OUTPATIENT)
Dept: ORTHOPEDIC SURGERY | Facility: CLINIC | Age: 85
End: 2020-02-05
Payer: MEDICARE

## 2020-02-05 VITALS
DIASTOLIC BLOOD PRESSURE: 67 MMHG | BODY MASS INDEX: 38.64 KG/M2 | HEART RATE: 91 BPM | HEIGHT: 62 IN | WEIGHT: 210 LBS | SYSTOLIC BLOOD PRESSURE: 126 MMHG

## 2020-02-05 DIAGNOSIS — M54.9 DORSALGIA, UNSPECIFIED: ICD-10-CM

## 2020-02-05 DIAGNOSIS — T84.84XD PAIN DUE TO INTERNAL ORTHOPEDIC PROSTHETIC DEVICES, IMPLANTS AND GRAFTS, SUBSEQUENT ENCOUNTER: ICD-10-CM

## 2020-02-05 DIAGNOSIS — Z96.649 PAIN DUE TO INTERNAL ORTHOPEDIC PROSTHETIC DEVICES, IMPLANTS AND GRAFTS, SUBSEQUENT ENCOUNTER: ICD-10-CM

## 2020-02-05 DIAGNOSIS — G89.29 DORSALGIA, UNSPECIFIED: ICD-10-CM

## 2020-02-05 PROCEDURE — 73565 X-RAY EXAM OF KNEES: CPT

## 2020-02-05 PROCEDURE — 99204 OFFICE O/P NEW MOD 45 MIN: CPT

## 2020-02-05 PROCEDURE — 72100 X-RAY EXAM L-S SPINE 2/3 VWS: CPT

## 2020-02-05 PROCEDURE — 72170 X-RAY EXAM OF PELVIS: CPT

## 2020-02-18 PROBLEM — M54.9 BACK PAIN, CHRONIC: Status: ACTIVE | Noted: 2020-02-18

## 2020-02-26 NOTE — PHYSICAL EXAM
[FreeTextEntry1] : Physical exam reveals a healthy-looking patient in no apparent distress patient appeared to be fully alert oriented complaining about low back pain and shooting pain to the left iliac area. On exam patient has a good range of motion of both hips and the joint no palpable masses no gross neurovascular deficits x-ray of the lumbar spine demonstrate significant degenerative osteoarthritis with disc disease x-rays of the pelvis and hips demonstrates stable bilateral total hip replacement x-rays of both knees demonstrates a stable prosthesis and dysphagia patient was recommended to be followed conservatively and to be seen as needed

## 2020-05-26 ENCOUNTER — RX RENEWAL (OUTPATIENT)
Age: 85
End: 2020-05-26

## 2020-06-25 ENCOUNTER — APPOINTMENT (OUTPATIENT)
Dept: INTERNAL MEDICINE | Facility: CLINIC | Age: 85
End: 2020-06-25

## 2020-07-23 ENCOUNTER — APPOINTMENT (OUTPATIENT)
Dept: OTOLARYNGOLOGY | Facility: CLINIC | Age: 85
End: 2020-07-23

## 2020-08-13 ENCOUNTER — APPOINTMENT (OUTPATIENT)
Dept: INTERNAL MEDICINE | Facility: CLINIC | Age: 85
End: 2020-08-13
Payer: MEDICARE

## 2020-08-13 ENCOUNTER — NON-APPOINTMENT (OUTPATIENT)
Age: 85
End: 2020-08-13

## 2020-08-13 VITALS
HEART RATE: 83 BPM | HEIGHT: 61.5 IN | DIASTOLIC BLOOD PRESSURE: 70 MMHG | OXYGEN SATURATION: 97 % | WEIGHT: 211 LBS | SYSTOLIC BLOOD PRESSURE: 122 MMHG | TEMPERATURE: 97.6 F | BODY MASS INDEX: 39.33 KG/M2

## 2020-08-13 DIAGNOSIS — H61.22 IMPACTED CERUMEN, LEFT EAR: ICD-10-CM

## 2020-08-13 DIAGNOSIS — E55.9 VITAMIN D DEFICIENCY, UNSPECIFIED: ICD-10-CM

## 2020-08-13 DIAGNOSIS — N39.0 URINARY TRACT INFECTION, SITE NOT SPECIFIED: ICD-10-CM

## 2020-08-13 DIAGNOSIS — I20.8 OTHER FORMS OF ANGINA PECTORIS: ICD-10-CM

## 2020-08-13 DIAGNOSIS — Z12.83 ENCOUNTER FOR SCREENING FOR MALIGNANT NEOPLASM OF SKIN: ICD-10-CM

## 2020-08-13 DIAGNOSIS — Z86.79 PERSONAL HISTORY OF OTHER DISEASES OF THE CIRCULATORY SYSTEM: ICD-10-CM

## 2020-08-13 PROCEDURE — 36415 COLL VENOUS BLD VENIPUNCTURE: CPT

## 2020-08-13 PROCEDURE — G0439: CPT

## 2020-08-13 PROCEDURE — 93000 ELECTROCARDIOGRAM COMPLETE: CPT | Mod: 59

## 2020-08-14 LAB
25(OH)D3 SERPL-MCNC: 39.1 NG/ML
ALBUMIN SERPL ELPH-MCNC: 4.4 G/DL
ALP BLD-CCNC: 83 U/L
ALT SERPL-CCNC: 15 U/L
ANION GAP SERPL CALC-SCNC: 15 MMOL/L
APPEARANCE: CLEAR
AST SERPL-CCNC: 18 U/L
BACTERIA: ABNORMAL
BASOPHILS # BLD AUTO: 0.08 K/UL
BASOPHILS NFR BLD AUTO: 1.2 %
BILIRUB SERPL-MCNC: 0.4 MG/DL
BILIRUBIN URINE: NEGATIVE
BLOOD URINE: NORMAL
BUN SERPL-MCNC: 30 MG/DL
CALCIUM SERPL-MCNC: 9.9 MG/DL
CHLORIDE SERPL-SCNC: 102 MMOL/L
CHOLEST SERPL-MCNC: 245 MG/DL
CHOLEST/HDLC SERPL: 3.5 RATIO
CO2 SERPL-SCNC: 23 MMOL/L
COLOR: NORMAL
CREAT SERPL-MCNC: 0.83 MG/DL
EOSINOPHIL # BLD AUTO: 0.24 K/UL
EOSINOPHIL NFR BLD AUTO: 3.6 %
ESTIMATED AVERAGE GLUCOSE: 126 MG/DL
FOLATE SERPL-MCNC: 9.4 NG/ML
GLUCOSE QUALITATIVE U: NEGATIVE
GLUCOSE SERPL-MCNC: 106 MG/DL
HBA1C MFR BLD HPLC: 6 %
HCT VFR BLD CALC: 42.9 %
HDLC SERPL-MCNC: 71 MG/DL
HGB BLD-MCNC: 13.8 G/DL
HYALINE CASTS: 2 /LPF
IMM GRANULOCYTES NFR BLD AUTO: 0.5 %
IRON SERPL-MCNC: 69 UG/DL
KETONES URINE: NEGATIVE
LDLC SERPL CALC-MCNC: 155 MG/DL
LEUKOCYTE ESTERASE URINE: ABNORMAL
LYMPHOCYTES # BLD AUTO: 1.66 K/UL
LYMPHOCYTES NFR BLD AUTO: 25.2 %
MAN DIFF?: NORMAL
MCHC RBC-ENTMCNC: 30.1 PG
MCHC RBC-ENTMCNC: 32.2 GM/DL
MCV RBC AUTO: 93.5 FL
MICROSCOPIC-UA: NORMAL
MONOCYTES # BLD AUTO: 0.67 K/UL
MONOCYTES NFR BLD AUTO: 10.2 %
NEUTROPHILS # BLD AUTO: 3.91 K/UL
NEUTROPHILS NFR BLD AUTO: 59.3 %
NITRITE URINE: POSITIVE
PH URINE: 6.5
PLATELET # BLD AUTO: 284 K/UL
POTASSIUM SERPL-SCNC: 3.4 MMOL/L
PROT SERPL-MCNC: 6.7 G/DL
PROTEIN URINE: NEGATIVE
RBC # BLD: 4.59 M/UL
RBC # FLD: 14.4 %
RED BLOOD CELLS URINE: 3 /HPF
SODIUM SERPL-SCNC: 140 MMOL/L
SPECIFIC GRAVITY URINE: 1.02
SQUAMOUS EPITHELIAL CELLS: 2 /HPF
T4 FREE SERPL-MCNC: 1.3 NG/DL
TRIGL SERPL-MCNC: 93 MG/DL
TSH SERPL-ACNC: 1.84 UIU/ML
UROBILINOGEN URINE: NORMAL
VIT B12 SERPL-MCNC: 1594 PG/ML
WBC # FLD AUTO: 6.59 K/UL
WHITE BLOOD CELLS URINE: 24 /HPF

## 2020-08-14 NOTE — ASSESSMENT
[FreeTextEntry1] : #HCM Basic lab ordered. Follow up for mammography with [facility]. Does not want more colorectal cancer screen. DEXA 2019, osteopenia, no calculated frax score. Up to date with prevnar and pneumo. HCP to be reviewed. Follow up with cardiologist, dermatology, ent, gi and ophthalmologist.

## 2020-08-14 NOTE — REVIEW OF SYSTEMS
[Fever] : no fever [Chills] : no chills [Fatigue] : no fatigue [Vision Problems] : no vision problems [Sore Throat] : no sore throat [Chest Pain] : no chest pain [Palpitations] : no palpitations [Orthopnea] : no orthopnea [Shortness Of Breath] : no shortness of breath [Wheezing] : no wheezing [Cough] : no cough [Dyspnea on Exertion] : no dyspnea on exertion [Abdominal Pain] : no abdominal pain [Diarrhea] : diarrhea [Dysuria] : no dysuria [Hematuria] : no hematuria

## 2020-08-14 NOTE — HISTORY OF PRESENT ILLNESS
[FreeTextEntry1] : Physical  [de-identified] : Ms. TAY LAZO is a 85 year old woman with pmhx of HTN, seasonal allergies on allergra who presents for physical. She uses canes to ambulate and sees a PT 3 trimes a week. She has been in good health and spirits. Positive covid ab in June 2020. Accompanied by her daughter who said she was also positive and likely the source. She denied any dyspnea on exertion. \par

## 2020-08-14 NOTE — HEALTH RISK ASSESSMENT
[Very Good] : ~his/her~ current health as very good [Good] : ~his/her~  mood as  good [No] : In the past 12 months have you used drugs other than those required for medical reasons? No [No falls in past year] : Patient reported no falls in the past year [0] : 2) Feeling down, depressed, or hopeless: Not at all (0) [None] : None [Retired] : retired [] :  [With Family] : lives with family [Fully functional (using the telephone, shopping, preparing meals, housekeeping, doing laundry, using] : Fully functional and needs no help or supervision to perform IADLs (using the telephone, shopping, preparing meals, housekeeping, doing laundry, using transportation, managing medications and managing finances) [Fully functional (bathing, dressing, toileting, transferring, walking, feeding)] : Fully functional (bathing, dressing, toileting, transferring, walking, feeding) [Reports changes in hearing] : Reports changes in hearing [Smoke Detector] : smoke detector [Carbon Monoxide Detector] : carbon monoxide detector [Seat Belt] :  uses seat belt [] : No [de-identified] : none [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  [de-identified] : PT [de-identified] : Balanced  [[id number]] : 0 [Reports changes in vision] : Reports no changes in vision [Sunscreen] : does not use sunscreen

## 2020-08-14 NOTE — PHYSICAL EXAM
[No Acute Distress] : no acute distress [Well Developed] : well developed [Normal Sclera/Conjunctiva] : normal sclera/conjunctiva [Normal Outer Ear/Nose] : the outer ears and nose were normal in appearance [Normal Oropharynx] : the oropharynx was normal [No JVD] : no jugular venous distention [No Lymphadenopathy] : no lymphadenopathy [Supple] : supple [No Respiratory Distress] : no respiratory distress  [No Accessory Muscle Use] : no accessory muscle use [Normal Rate] : normal rate  [Clear to Auscultation] : lungs were clear to auscultation bilaterally [Regular Rhythm] : with a regular rhythm [Normal S1, S2] : normal S1 and S2 [No Murmur] : no murmur heard [No Edema] : there was no peripheral edema [Pedal Pulses Present] : the pedal pulses are present [Soft] : abdomen soft [Non Tender] : non-tender [Normal Bowel Sounds] : normal bowel sounds [Normal Supraclavicular Nodes] : no supraclavicular lymphadenopathy [Non-distended] : non-distended [Normal Anterior Cervical Nodes] : no anterior cervical lymphadenopathy [Normal Posterior Cervical Nodes] : no posterior cervical lymphadenopathy [No Spinal Tenderness] : no spinal tenderness [No CVA Tenderness] : no CVA  tenderness [Grossly Normal Strength/Tone] : grossly normal strength/tone [Coordination Grossly Intact] : coordination grossly intact [No Focal Deficits] : no focal deficits [Normal Gait] : normal gait [de-identified] : Excessive cerumen on the left ear

## 2020-09-30 ENCOUNTER — APPOINTMENT (OUTPATIENT)
Dept: OTOLARYNGOLOGY | Facility: CLINIC | Age: 85
End: 2020-09-30

## 2020-10-21 ENCOUNTER — APPOINTMENT (OUTPATIENT)
Dept: DERMATOLOGY | Facility: CLINIC | Age: 85
End: 2020-10-21

## 2020-12-09 DIAGNOSIS — R22.1 LOCALIZED SWELLING, MASS AND LUMP, NECK: ICD-10-CM

## 2020-12-21 PROBLEM — J06.9 URI WITH COUGH AND CONGESTION: Status: RESOLVED | Noted: 2019-05-20 | Resolved: 2020-12-21

## 2020-12-23 PROBLEM — N39.0 ACUTE UTI: Status: RESOLVED | Noted: 2020-08-14 | Resolved: 2020-12-23

## 2021-01-12 ENCOUNTER — APPOINTMENT (OUTPATIENT)
Dept: INTERNAL MEDICINE | Facility: CLINIC | Age: 86
End: 2021-01-12
Payer: MEDICARE

## 2021-01-12 ENCOUNTER — APPOINTMENT (OUTPATIENT)
Dept: INTERNAL MEDICINE | Facility: CLINIC | Age: 86
End: 2021-01-12

## 2021-01-12 VITALS
HEIGHT: 61.5 IN | OXYGEN SATURATION: 97 % | TEMPERATURE: 97.1 F | SYSTOLIC BLOOD PRESSURE: 125 MMHG | BODY MASS INDEX: 40.63 KG/M2 | DIASTOLIC BLOOD PRESSURE: 75 MMHG | WEIGHT: 218 LBS | HEART RATE: 82 BPM

## 2021-01-12 DIAGNOSIS — Z23 ENCOUNTER FOR IMMUNIZATION: ICD-10-CM

## 2021-01-12 DIAGNOSIS — K86.2 CYST OF PANCREAS: ICD-10-CM

## 2021-01-12 PROCEDURE — 36415 COLL VENOUS BLD VENIPUNCTURE: CPT

## 2021-01-12 PROCEDURE — G0447 BEHAVIOR COUNSEL OBESITY 15M: CPT | Mod: 59

## 2021-01-12 PROCEDURE — 99214 OFFICE O/P EST MOD 30 MIN: CPT | Mod: 25

## 2021-01-12 RX ORDER — FLUTICASONE FUROATE 27.5 UG/1
27.5 SPRAY, METERED NASAL DAILY
Qty: 1 | Refills: 0 | Status: DISCONTINUED | COMMUNITY
Start: 2020-08-13 | End: 2021-01-12

## 2021-01-12 NOTE — COUNSELING
[Potential consequences of obesity discussed] : Potential consequences of obesity discussed [Benefits of weight loss discussed] : Benefits of weight loss discussed [Encouraged to increase physical activity] : Encouraged to increase physical activity [Target Wt Loss Goal ___] : Weight Loss Goals: Target weight loss goal [unfilled] lbs [Decrease Portions] : decrease portions [Weigh Self Weekly] : weigh self weekly [FreeTextEntry4] : 15

## 2021-01-12 NOTE — HEALTH RISK ASSESSMENT
[0] : 1) Little interest or pleasure doing things: Not at all (0) [1] : 2) Feeling down, depressed, or hopeless for several days (1) [KLL1Enbew] : 0

## 2021-01-12 NOTE — HISTORY OF PRESENT ILLNESS
[FreeTextEntry1] : Follow up  [de-identified] : Ms. TAY LAZO is a 85 year old woman with pmhx of HTN, seasonal allergies who presents for a follow up. She is undergoing on us thyroid for follow up. She endorses weight gain during the pandemic. She wants to get tested for a1c and lipid panel. She is following \par Dr. Dejan Vogel for GI and is pending a repeat MRI for her pancreas.

## 2021-01-12 NOTE — PHYSICAL EXAM
[No Acute Distress] : no acute distress [Well Nourished] : well nourished [Well Developed] : well developed [Normal Sclera/Conjunctiva] : normal sclera/conjunctiva [Normal Outer Ear/Nose] : the outer ears and nose were normal in appearance [No Lymphadenopathy] : no lymphadenopathy [Supple] : supple [No Respiratory Distress] : no respiratory distress  [No Accessory Muscle Use] : no accessory muscle use [Clear to Auscultation] : lungs were clear to auscultation bilaterally [Normal Rate] : normal rate  [Regular Rhythm] : with a regular rhythm [Normal S1, S2] : normal S1 and S2 [No Murmur] : no murmur heard [Pedal Pulses Present] : the pedal pulses are present [No Edema] : there was no peripheral edema [No Extremity Clubbing/Cyanosis] : no extremity clubbing/cyanosis [Soft] : abdomen soft [Non Tender] : non-tender [Normal Bowel Sounds] : normal bowel sounds

## 2021-01-13 DIAGNOSIS — E04.1 NONTOXIC SINGLE THYROID NODULE: ICD-10-CM

## 2021-01-13 LAB
ALBUMIN SERPL ELPH-MCNC: 4 G/DL
ALP BLD-CCNC: 93 U/L
ALT SERPL-CCNC: 19 U/L
ANION GAP SERPL CALC-SCNC: 14 MMOL/L
APPEARANCE: CLEAR
AST SERPL-CCNC: 21 U/L
BACTERIA: NEGATIVE
BASOPHILS # BLD AUTO: 0.06 K/UL
BASOPHILS NFR BLD AUTO: 0.9 %
BILIRUB SERPL-MCNC: 0.3 MG/DL
BILIRUBIN URINE: NEGATIVE
BLOOD URINE: NEGATIVE
BUN SERPL-MCNC: 22 MG/DL
CALCIUM SERPL-MCNC: 10.1 MG/DL
CHLORIDE SERPL-SCNC: 100 MMOL/L
CHOLEST SERPL-MCNC: 220 MG/DL
CO2 SERPL-SCNC: 25 MMOL/L
COLOR: NORMAL
CREAT SERPL-MCNC: 0.91 MG/DL
EOSINOPHIL # BLD AUTO: 0.2 K/UL
EOSINOPHIL NFR BLD AUTO: 3.2 %
ESTIMATED AVERAGE GLUCOSE: 117 MG/DL
GLUCOSE QUALITATIVE U: NEGATIVE
GLUCOSE SERPL-MCNC: 87 MG/DL
HBA1C MFR BLD HPLC: 5.7 %
HCT VFR BLD CALC: 42.5 %
HDLC SERPL-MCNC: 70 MG/DL
HGB BLD-MCNC: 13.6 G/DL
HYALINE CASTS: 1 /LPF
IMM GRANULOCYTES NFR BLD AUTO: 0.3 %
KETONES URINE: NEGATIVE
LDLC SERPL CALC-MCNC: 129 MG/DL
LEUKOCYTE ESTERASE URINE: NEGATIVE
LYMPHOCYTES # BLD AUTO: 1.3 K/UL
LYMPHOCYTES NFR BLD AUTO: 20.6 %
MAN DIFF?: NORMAL
MCHC RBC-ENTMCNC: 30.6 PG
MCHC RBC-ENTMCNC: 32 GM/DL
MCV RBC AUTO: 95.7 FL
MICROSCOPIC-UA: NORMAL
MONOCYTES # BLD AUTO: 0.78 K/UL
MONOCYTES NFR BLD AUTO: 12.3 %
NEUTROPHILS # BLD AUTO: 3.96 K/UL
NEUTROPHILS NFR BLD AUTO: 62.7 %
NITRITE URINE: NEGATIVE
NONHDLC SERPL-MCNC: 149 MG/DL
PH URINE: 7
PLATELET # BLD AUTO: 291 K/UL
POTASSIUM SERPL-SCNC: 4 MMOL/L
PROT SERPL-MCNC: 6.6 G/DL
PROTEIN URINE: NEGATIVE
RBC # BLD: 4.44 M/UL
RBC # FLD: 14.1 %
RED BLOOD CELLS URINE: 1 /HPF
SODIUM SERPL-SCNC: 140 MMOL/L
SPECIFIC GRAVITY URINE: 1.01
SQUAMOUS EPITHELIAL CELLS: 2 /HPF
TRIGL SERPL-MCNC: 103 MG/DL
UROBILINOGEN URINE: NORMAL
WBC # FLD AUTO: 6.32 K/UL
WHITE BLOOD CELLS URINE: 0 /HPF

## 2021-01-14 ENCOUNTER — NON-APPOINTMENT (OUTPATIENT)
Age: 86
End: 2021-01-14

## 2021-01-15 LAB — BACTERIA UR CULT: NORMAL

## 2021-02-03 ENCOUNTER — RESULT REVIEW (OUTPATIENT)
Age: 86
End: 2021-02-03

## 2021-02-03 ENCOUNTER — APPOINTMENT (OUTPATIENT)
Dept: ULTRASOUND IMAGING | Facility: CLINIC | Age: 86
End: 2021-02-03

## 2021-02-03 ENCOUNTER — APPOINTMENT (OUTPATIENT)
Dept: MRI IMAGING | Facility: CLINIC | Age: 86
End: 2021-02-03

## 2021-02-03 ENCOUNTER — OUTPATIENT (OUTPATIENT)
Dept: OUTPATIENT SERVICES | Facility: HOSPITAL | Age: 86
LOS: 1 days | End: 2021-02-03
Payer: MEDICARE

## 2021-02-03 DIAGNOSIS — E04.1 NONTOXIC SINGLE THYROID NODULE: ICD-10-CM

## 2021-02-03 DIAGNOSIS — G56.00 CARPAL TUNNEL SYNDROME, UNSPECIFIED UPPER LIMB: Chronic | ICD-10-CM

## 2021-02-03 DIAGNOSIS — H26.9 UNSPECIFIED CATARACT: Chronic | ICD-10-CM

## 2021-02-03 DIAGNOSIS — Z96.659 PRESENCE OF UNSPECIFIED ARTIFICIAL KNEE JOINT: Chronic | ICD-10-CM

## 2021-02-03 DIAGNOSIS — Z96.60 PRESENCE OF UNSPECIFIED ORTHOPEDIC JOINT IMPLANT: Chronic | ICD-10-CM

## 2021-02-03 PROCEDURE — A9585: CPT

## 2021-02-03 PROCEDURE — 76536 US EXAM OF HEAD AND NECK: CPT

## 2021-02-03 PROCEDURE — 74183 MRI ABD W/O CNTR FLWD CNTR: CPT | Mod: 26,MH

## 2021-02-03 PROCEDURE — 76536 US EXAM OF HEAD AND NECK: CPT | Mod: 26

## 2021-02-03 PROCEDURE — 74183 MRI ABD W/O CNTR FLWD CNTR: CPT

## 2021-02-08 ENCOUNTER — NON-APPOINTMENT (OUTPATIENT)
Age: 86
End: 2021-02-08

## 2021-02-08 ENCOUNTER — RX RENEWAL (OUTPATIENT)
Age: 86
End: 2021-02-08

## 2021-02-08 DIAGNOSIS — B96.89 ACUTE SINUSITIS, UNSPECIFIED: ICD-10-CM

## 2021-02-08 DIAGNOSIS — J01.90 ACUTE SINUSITIS, UNSPECIFIED: ICD-10-CM

## 2021-06-30 ENCOUNTER — NON-APPOINTMENT (OUTPATIENT)
Age: 86
End: 2021-06-30

## 2021-07-06 ENCOUNTER — APPOINTMENT (OUTPATIENT)
Dept: CARDIOLOGY | Facility: CLINIC | Age: 86
End: 2021-07-06
Payer: MEDICARE

## 2021-07-06 ENCOUNTER — NON-APPOINTMENT (OUTPATIENT)
Age: 86
End: 2021-07-06

## 2021-07-06 VITALS
SYSTOLIC BLOOD PRESSURE: 138 MMHG | BODY MASS INDEX: 42.01 KG/M2 | OXYGEN SATURATION: 95 % | WEIGHT: 226 LBS | HEART RATE: 86 BPM | DIASTOLIC BLOOD PRESSURE: 78 MMHG

## 2021-07-06 PROCEDURE — 99215 OFFICE O/P EST HI 40 MIN: CPT

## 2021-07-06 PROCEDURE — 93000 ELECTROCARDIOGRAM COMPLETE: CPT

## 2021-07-06 NOTE — PHYSICAL EXAM
[General Appearance - Well Developed] : well developed [Normal Appearance] : normal appearance [Well Groomed] : well groomed [General Appearance - Well Nourished] : well nourished [No Deformities] : no deformities [General Appearance - In No Acute Distress] : no acute distress [Normal Conjunctiva] : the conjunctiva exhibited no abnormalities [Eyelids - No Xanthelasma] : the eyelids demonstrated no xanthelasmas [Normal Oral Mucosa] : normal oral mucosa [No Oral Pallor] : no oral pallor [No Oral Cyanosis] : no oral cyanosis [Normal Jugular Venous A Waves Present] : normal jugular venous A waves present [Normal Jugular Venous V Waves Present] : normal jugular venous V waves present [No Jugular Venous Brennan A Waves] : no jugular venous brennan A waves [Respiration, Rhythm And Depth] : normal respiratory rhythm and effort [Exaggerated Use Of Accessory Muscles For Inspiration] : no accessory muscle use [Auscultation Breath Sounds / Voice Sounds] : lungs were clear to auscultation bilaterally [Heart Rate And Rhythm] : heart rate and rhythm were normal [Heart Sounds] : normal S1 and S2 [Murmurs] : no murmurs present [Abdomen Soft] : soft [Abdomen Tenderness] : non-tender [Abdomen Mass (___ Cm)] : no abdominal mass palpated [Abnormal Walk] : normal gait [Nail Clubbing] : no clubbing of the fingernails [Cyanosis, Localized] : no localized cyanosis [Petechial Hemorrhages (___cm)] : no petechial hemorrhages [Skin Color & Pigmentation] : normal skin color and pigmentation [] : no rash [No Venous Stasis] : no venous stasis [Skin Lesions] : no skin lesions [No Skin Ulcers] : no skin ulcer [No Xanthoma] : no  xanthoma was observed [Oriented To Time, Place, And Person] : oriented to person, place, and time [Affect] : the affect was normal [Mood] : the mood was normal [No Anxiety] : not feeling anxious [FreeTextEntry1] : Non-pitting edema.  Pedal pulses 2+

## 2021-07-06 NOTE — CARDIOLOGY SUMMARY
[Ant Wall Defect] : anterior wall defect [___] : [unfilled] [LVEF ___%] : LVEF [unfilled]% [None] : no pulmonary hypertension [Mild] : mild mitral regurgitation [Normal] : normal

## 2021-07-06 NOTE — REVIEW OF SYSTEMS
[Weight Gain (___ Lbs)] : [unfilled] ~Ulb weight gain [Dyspnea on exertion] : dyspnea during exertion [Lower Ext Edema] : lower extremity edema [Joint Pain] : joint pain [Joint Stiffness] : joint stiffness [Negative] : Heme/Lymph [Chest Discomfort] : no chest discomfort [Leg Claudication] : no intermittent leg claudication [Syncope] : no syncope [FreeTextEntry3] : "Amaurosis" x4 [FreeTextEntry9] : severe DJD on MRI [de-identified] : see HPI

## 2021-07-21 ENCOUNTER — APPOINTMENT (OUTPATIENT)
Dept: INTERNAL MEDICINE | Facility: CLINIC | Age: 86
End: 2021-07-21
Payer: MEDICARE

## 2021-07-21 VITALS — SYSTOLIC BLOOD PRESSURE: 120 MMHG | DIASTOLIC BLOOD PRESSURE: 60 MMHG

## 2021-07-21 VITALS
HEART RATE: 91 BPM | HEIGHT: 61.5 IN | TEMPERATURE: 97.7 F | BODY MASS INDEX: 42.31 KG/M2 | OXYGEN SATURATION: 98 % | WEIGHT: 227 LBS

## 2021-07-21 DIAGNOSIS — Z23 ENCOUNTER FOR IMMUNIZATION: ICD-10-CM

## 2021-07-21 PROCEDURE — 99213 OFFICE O/P EST LOW 20 MIN: CPT | Mod: 25

## 2021-07-21 PROCEDURE — G0439: CPT

## 2021-07-21 PROCEDURE — 36415 COLL VENOUS BLD VENIPUNCTURE: CPT

## 2021-07-21 PROCEDURE — 90471 IMMUNIZATION ADMIN: CPT

## 2021-07-21 PROCEDURE — 90715 TDAP VACCINE 7 YRS/> IM: CPT | Mod: GY

## 2021-07-21 PROCEDURE — G0444 DEPRESSION SCREEN ANNUAL: CPT | Mod: 59

## 2021-07-21 RX ORDER — ASPIRIN 81 MG
6.5 TABLET, DELAYED RELEASE (ENTERIC COATED) ORAL
Qty: 1 | Refills: 1 | Status: COMPLETED | COMMUNITY
Start: 2020-08-13 | End: 2021-07-21

## 2021-07-21 RX ORDER — CLOPIDOGREL 75 MG/1
75 TABLET, FILM COATED ORAL
Refills: 0 | Status: COMPLETED | COMMUNITY
End: 2021-07-21

## 2021-07-21 RX ORDER — PHENAZOPYRIDINE HCL 95 MG
95 TABLET ORAL
Qty: 30 | Refills: 4 | Status: COMPLETED | COMMUNITY
Start: 2020-09-04 | End: 2021-07-21

## 2021-07-21 RX ORDER — NITROFURANTOIN (MONOHYDRATE/MACROCRYSTALS) 25; 75 MG/1; MG/1
100 CAPSULE ORAL
Qty: 10 | Refills: 0 | Status: COMPLETED | COMMUNITY
Start: 2020-08-14 | End: 2021-07-21

## 2021-08-04 LAB
25(OH)D3 SERPL-MCNC: 34.8 NG/ML
APPEARANCE: CLEAR
BACTERIA: NEGATIVE
BILIRUBIN URINE: NEGATIVE
BLOOD URINE: NORMAL
COLOR: NORMAL
GLUCOSE QUALITATIVE U: NEGATIVE
HYALINE CASTS: 1 /LPF
KETONES URINE: NEGATIVE
LEUKOCYTE ESTERASE URINE: ABNORMAL
MICROSCOPIC-UA: NORMAL
NITRITE URINE: NEGATIVE
PH URINE: 6
PROTEIN URINE: NEGATIVE
RED BLOOD CELLS URINE: 2 /HPF
SPECIFIC GRAVITY URINE: 1.02
SQUAMOUS EPITHELIAL CELLS: 6 /HPF
T4 FREE SERPL-MCNC: 1.1 NG/DL
TSH SERPL-ACNC: 2.61 UIU/ML
UROBILINOGEN URINE: NORMAL
WHITE BLOOD CELLS URINE: 13 /HPF

## 2021-08-05 ENCOUNTER — APPOINTMENT (OUTPATIENT)
Dept: INTERNAL MEDICINE | Facility: CLINIC | Age: 86
End: 2021-08-05
Payer: MEDICARE

## 2021-08-05 PROCEDURE — 36415 COLL VENOUS BLD VENIPUNCTURE: CPT

## 2021-08-05 PROCEDURE — 81003 URINALYSIS AUTO W/O SCOPE: CPT | Mod: QW

## 2021-08-06 LAB
ALBUMIN SERPL ELPH-MCNC: 4.5 G/DL
ALP BLD-CCNC: 96 U/L
ALT SERPL-CCNC: 17 U/L
ANION GAP SERPL CALC-SCNC: 17 MMOL/L
AST SERPL-CCNC: 21 U/L
BILIRUB SERPL-MCNC: 0.4 MG/DL
BUN SERPL-MCNC: 26 MG/DL
CALCIUM SERPL-MCNC: 10.6 MG/DL
CHLORIDE SERPL-SCNC: 97 MMOL/L
CO2 SERPL-SCNC: 24 MMOL/L
CREAT SERPL-MCNC: 0.85 MG/DL
GLUCOSE SERPL-MCNC: 106 MG/DL
POTASSIUM SERPL-SCNC: 4.3 MMOL/L
PROT SERPL-MCNC: 7.3 G/DL
SODIUM SERPL-SCNC: 138 MMOL/L
URATE SERPL-MCNC: 7.9 MG/DL

## 2021-08-09 ENCOUNTER — APPOINTMENT (OUTPATIENT)
Dept: INTERNAL MEDICINE | Facility: CLINIC | Age: 86
End: 2021-08-09

## 2021-08-11 ENCOUNTER — RX RENEWAL (OUTPATIENT)
Age: 86
End: 2021-08-11

## 2021-08-11 ENCOUNTER — APPOINTMENT (OUTPATIENT)
Dept: INTERNAL MEDICINE | Facility: CLINIC | Age: 86
End: 2021-08-11
Payer: MEDICARE

## 2021-08-11 VITALS
DIASTOLIC BLOOD PRESSURE: 70 MMHG | SYSTOLIC BLOOD PRESSURE: 130 MMHG | HEART RATE: 87 BPM | TEMPERATURE: 97.2 F | OXYGEN SATURATION: 98 %

## 2021-08-11 PROCEDURE — 99214 OFFICE O/P EST MOD 30 MIN: CPT

## 2021-08-11 RX ORDER — BENZONATATE 100 MG/1
100 CAPSULE ORAL 3 TIMES DAILY
Qty: 30 | Refills: 1 | Status: DISCONTINUED | COMMUNITY
Start: 2017-12-26 | End: 2021-08-11

## 2021-08-11 NOTE — HISTORY OF PRESENT ILLNESS
[FreeTextEntry8] : Ms. TAY LAZO is a 86 year old woman with pmhx of HTN, seasonal allergies who presents for an acute visit. \par Around a week and half ago developed toe pain concerning for gout. She had blood work which showed elevated uric acid. She was seen for gout flare by podiatrist was prescribed Medrol pack which she finished two days ago. Pain is still present. Swelling has reduced. Redness is still presents. She cannot take NSAIDs due to stomach. She is accompanied by daughter, they are requesting additional medical therapy. \par \par Before this she was eating excesive red meats. Now cut out and is drinking more water. Explained diuretic can predispose to increase uric acid levels.

## 2021-08-11 NOTE — ASSESSMENT
[FreeTextEntry1] : Podiatry record to be faxed over. \par \par Encouraged compression stockings. \par \par \par Patient to return in 1-3 months with Dr. Haley to discuss allopurinol and colchicine.

## 2021-08-11 NOTE — PHYSICAL EXAM
[No Acute Distress] : no acute distress [Well Nourished] : well nourished [Well Developed] : well developed [Normal Sclera/Conjunctiva] : normal sclera/conjunctiva [Normal Outer Ear/Nose] : the outer ears and nose were normal in appearance [No Respiratory Distress] : no respiratory distress  [No Accessory Muscle Use] : no accessory muscle use [de-identified] : Redness overlying toe.

## 2021-08-19 PROBLEM — Z23 NEED FOR DIPHTHERIA-TETANUS-PERTUSSIS (TDAP) VACCINE: Status: ACTIVE | Noted: 2021-07-21

## 2021-08-19 RX ORDER — ZOSTER VACCINE RECOMBINANT, ADJUVANTED 50 MCG/0.5
50 KIT INTRAMUSCULAR
Qty: 1 | Refills: 1 | Status: COMPLETED | COMMUNITY
Start: 2021-01-12 | End: 2021-08-19

## 2021-08-19 NOTE — HEALTH RISK ASSESSMENT
[Very Good] : ~his/her~ current health as very good [Good] : ~his/her~  mood as  good [No] : In the past 12 months have you used drugs other than those required for medical reasons? No [No falls in past year] : Patient reported no falls in the past year [None] : None [With Family] : lives with family [Retired] : retired [] :  [Fully functional (bathing, dressing, toileting, transferring, walking, feeding)] : Fully functional (bathing, dressing, toileting, transferring, walking, feeding) [Fully functional (using the telephone, shopping, preparing meals, housekeeping, doing laundry, using] : Fully functional and needs no help or supervision to perform IADLs (using the telephone, shopping, preparing meals, housekeeping, doing laundry, using transportation, managing medications and managing finances) [Reports changes in hearing] : Reports changes in hearing [Smoke Detector] : smoke detector [Carbon Monoxide Detector] : carbon monoxide detector [Seat Belt] :  uses seat belt [HIV test declined] : HIV test declined [Hepatitis C test declined] : Hepatitis C test declined [Alone] : lives alone [Name: ___] : Health Care Proxy's Name: [unfilled]  [Relationship: ___] : Relationship: [unfilled] [Patient reported mammogram was normal] : Patient reported mammogram was normal [Patient reported bone density results were abnormal] : Patient reported bone density results were abnormal [With Patient/Caregiver] : , with patient/caregiver [Reviewed updated] : Reviewed, updated [Designated Healthcare Proxy] : Designated healthcare proxy [] : No [0] : 1) Little interest or pleasure doing things: Not at all (0) [1] : 2) Feeling down, depressed, or hopeless for several days (1) [PHQ-2 Negative - No further assessment needed] : PHQ-2 Negative - No further assessment needed [de-identified] : none [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  [de-identified] : PT [de-identified] : Balanced  [de-identified] : Has shower bar. [AAD1Unwfl] : 1 [Change in mental status noted] : No change in mental status noted [Language] : denies difficulty with language [Behavior] : denies difficulty with behavior [Handling Complex Tasks] : denies difficulty handling complex tasks [Reasoning] : denies difficulty with reasoning [Sexually Active] : not sexually active [Reports changes in vision] : Reports no changes in vision [Sunscreen] : does not use sunscreen [MammogramDate] : 01/16 [MammogramComments] : 1/20/2016 [BoneDensityComments] : 1/3/2019 - spine +4.6 (but DJD limits accuracy), L wrist -1.2.  Hip not done. [de-identified] : Not driving a car currently (stopped when the pandemic started). 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 [de-identified] : Dr. Jones. [AdvancecareDate] : 7/21 [FreeTextEntry4] : Health Care Proxy from 1/17/2019 on chart.

## 2021-08-19 NOTE — ASSESSMENT
[FreeTextEntry1] : (1) HCM - discuss diet, exercise, weight maintenance.  Labs drawn in office as below.  Patient given Tdap today.  She received the Moderna COVID-19 vaccine this year, and the influenza vaccination last season.  She received Prevnar-13 10/14/2015 and Pneumovax 11/19/2018.  She declines Shingrix.  Last mammogram 2016, and patient has discontinued screening as well.  Follow-up with Gyn as needed.  Colon cancer screening can be discontinued due to age.  Bone density showed osteopenia at the wrist in 2019, limited evaluation of spine due to DJD of the spine and overlapping structures, and hip was not able to be done.  Patient does not wish to return for a bone density at this time.  Health Care Proxy on chart.\par \par (2) CV - patient with hypertension and hyperlipidemia.  She is on Dyazide with adequate BP control.  She did not tolerate statins, and is not on medication for her cholesterol.  She had several episodes of amaurosis fugax recently, but work-up did not show atherosclerosis.  A statin was still recommended, but she declined.  She is on Plavix, and Dr. Arora is considering discontinuing it after her retina evaluation is complete.  Follow-up with Dr. Arora as directed.\par \par (3) Allergic rhinitis - continue Claritin or Benadryl.  She has Flonase, although not using it at this time.\par \par (4) DJD - patient walking with a cane, and has home PT.  She sees a pain management specialist and has received several epidurals.\par \par (5) Venous insufficiency - leg elevation, support stockings.\par \par (6) GI - patient with h/o IPMN (pancreatic cyst), and can follow-up with GI as directed.  She also has GERD, currently not using medication.\par \par \par \par

## 2021-08-19 NOTE — HISTORY OF PRESENT ILLNESS
[FreeTextEntry1] : Physical  [de-identified] : TAY LAZO is an 85 year old woman with a history of hypertension, seasonal allergies on Claritin who presents for physical. She uses canes to ambulate.  She sees Dr Sven Anderson (pain management), and just got her 3rd epidural this past Monday 7/19/2021.  A physical therapist comes to the home.  Her spouse passed away in May 2021, and she has been coping well.  She had a positive COVID-19 antibody in June 2020 (pre-vaccination).  Her daughter previously indicated that she was also positive and likely the source.  Patient has no chest pains or dyspnea on exertion. \par \par Patient was seen in [facility] after 4 episodes of amaurosis fugax in 1 month.  Patient says her right eye blacked out a few times over several weeks.  Her ophthalmologist (Dr. Stewart) sent her to the ER.  Work-up negative, and no indication of atherosclerosis (including MRI brain.  Patient is on Plavix 75mg daily as she didn't tolerate ASA.  She declined statins as she didn't tolerate them in the past.  She was sent to a retina specialist (Dr. Pedro Holt).\par \par For her rhinitis, patient is taking Benadryl, and not using Flonase.  She has a chronic cough, with occasional morning phlegm that is clear to yellow (no blood).\par \par Patient reports receiving the Moderna COVID-19 vaccine 1/20/21 and 2/20/21.  She declines Shingrix, but is willing to take Tdap today.\par \par Patient says she sometimes gets a cold feeling in the toes.

## 2021-08-19 NOTE — PHYSICAL EXAM
[Well Developed] : well developed [Normal Sclera/Conjunctiva] : normal sclera/conjunctiva [Normal Outer Ear/Nose] : the outer ears and nose were normal in appearance [Normal Oropharynx] : the oropharynx was normal [No JVD] : no jugular venous distention [No Lymphadenopathy] : no lymphadenopathy [Supple] : supple [No Respiratory Distress] : no respiratory distress  [No Accessory Muscle Use] : no accessory muscle use [Clear to Auscultation] : lungs were clear to auscultation bilaterally [Normal Rate] : normal rate  [Regular Rhythm] : with a regular rhythm [Normal S1, S2] : normal S1 and S2 [No Murmur] : no murmur heard [Pedal Pulses Present] : the pedal pulses are present [No Edema] : there was no peripheral edema [Soft] : abdomen soft [Non Tender] : non-tender [Non-distended] : non-distended [Normal Bowel Sounds] : normal bowel sounds [Normal Supraclavicular Nodes] : no supraclavicular lymphadenopathy [Normal Posterior Cervical Nodes] : no posterior cervical lymphadenopathy [Normal Anterior Cervical Nodes] : no anterior cervical lymphadenopathy [No CVA Tenderness] : no CVA  tenderness [No Spinal Tenderness] : no spinal tenderness [Grossly Normal Strength/Tone] : grossly normal strength/tone [Coordination Grossly Intact] : coordination grossly intact [No Focal Deficits] : no focal deficits [Normal Gait] : normal gait [Normal Voice/Communication] : normal voice/communication [Normal TMs] : both tympanic membranes were normal [Normal Appearance] : normal in appearance [No Masses] : no palpable masses [No Nipple Discharge] : no nipple discharge [No Axillary Lymphadenopathy] : no axillary lymphadenopathy [de-identified] : Excessive cerumen on the left ear

## 2021-10-11 ENCOUNTER — NON-APPOINTMENT (OUTPATIENT)
Age: 86
End: 2021-10-11

## 2021-10-18 ENCOUNTER — APPOINTMENT (OUTPATIENT)
Dept: INTERNAL MEDICINE | Facility: CLINIC | Age: 86
End: 2021-10-18
Payer: MEDICARE

## 2021-10-18 ENCOUNTER — APPOINTMENT (OUTPATIENT)
Dept: INTERNAL MEDICINE | Facility: CLINIC | Age: 86
End: 2021-10-18

## 2021-10-18 PROCEDURE — 99442: CPT | Mod: 95

## 2021-10-18 RX ORDER — TRIAMTERENE AND HYDROCHLOROTHIAZIDE 50; 75 MG/1; MG/1
75-50 TABLET ORAL
Qty: 90 | Refills: 0 | Status: COMPLETED | COMMUNITY
Start: 2021-06-09

## 2021-10-18 RX ORDER — METHYLPREDNISOLONE 4 MG/1
4 TABLET ORAL
Qty: 21 | Refills: 0 | Status: COMPLETED | COMMUNITY
Start: 2021-08-04

## 2021-10-20 ENCOUNTER — NON-APPOINTMENT (OUTPATIENT)
Age: 86
End: 2021-10-20

## 2021-10-22 ENCOUNTER — NON-APPOINTMENT (OUTPATIENT)
Age: 86
End: 2021-10-22

## 2021-10-26 ENCOUNTER — NON-APPOINTMENT (OUTPATIENT)
Age: 86
End: 2021-10-26

## 2021-10-28 ENCOUNTER — APPOINTMENT (OUTPATIENT)
Dept: OTOLARYNGOLOGY | Facility: CLINIC | Age: 86
End: 2021-10-28
Payer: MEDICARE

## 2021-10-28 VITALS — DIASTOLIC BLOOD PRESSURE: 82 MMHG | SYSTOLIC BLOOD PRESSURE: 151 MMHG | TEMPERATURE: 97.8 F | HEART RATE: 91 BPM

## 2021-10-28 DIAGNOSIS — H90.3 SENSORINEURAL HEARING LOSS, BILATERAL: ICD-10-CM

## 2021-10-28 DIAGNOSIS — R42 DIZZINESS AND GIDDINESS: ICD-10-CM

## 2021-10-28 DIAGNOSIS — J34.2 DEVIATED NASAL SEPTUM: ICD-10-CM

## 2021-10-28 DIAGNOSIS — H61.23 IMPACTED CERUMEN, BILATERAL: ICD-10-CM

## 2021-10-28 PROCEDURE — 31231 NASAL ENDOSCOPY DX: CPT

## 2021-10-28 PROCEDURE — 99213 OFFICE O/P EST LOW 20 MIN: CPT | Mod: 25

## 2021-10-28 NOTE — END OF VISIT
[FreeTextEntry3] : I personally saw and examined TAY LAZO in detail. I spoke to ANGELIQUE Montana regarding the assessment and plan of care. I reviewed the above assessment and plan of care, and agree. I have made changes in changes in the body of the note where appropriate.I personally reviewed the HPI, PMH, FH, SH, ROS and medications/allergies. I have spoken to ANGELIQUE Montana regarding the history and have personally determined the assessment and plan of care, and documented this myself. I was present and participated in all key portions of the encounter and all procedures noted above. I have made changes in the body of the note where appropriate.\par \par Attesting Faculty: See Attending Signature Below \par \par \par  [Time Spent: ___ minutes] : I have spent [unfilled] minutes of time on the encounter.

## 2021-10-28 NOTE — HISTORY OF PRESENT ILLNESS
[No] : patient does not have a  history of radiation therapy [Dizziness] : dizziness [Presbycusis] : presbycusis [Nasal Congestion] : nasal congestion [Cough] : cough [None] : No risk factors have been identified. [de-identified] : 85 yo female\par Patient with hx of hearing loss with hearing aids and complains of cough x 1 year. Her PCP recommended that she r/o PND being the cause of her cough. States its a constant cough, takes Benadryl and cough drops which helped. Continues to have imbalance, using a cane to ambulate. Pt has no ear pain, ear drainage, nasal congestion, nasal discharge, epistaxis, sinus infections, facial pain, facial pressure, throat pain, dysphagia or fevers\par \par  [Eustachian Tube Dysfunction] : no eustachian tube dysfunction [Cholesteatoma] : no cholesteatoma [Early Onset Hearing Loss] : no early onset hearing loss [Stroke] : no stroke [Adenoidectomy] : no adenoidectomy [Allergies] : no allergies [Asthma] : no asthma [Hyperthyroidism] : no hyperthyroidism [Sialadenitis] : no sialadenitis [Hodgkin Disease] : no hodgkin disease [Non-Hodgkin Lymphoma] : no non-hodgkin lymphoma [Graves Disease] : no graves disease [Thyroid Cancer] : no thyroid cancer

## 2021-10-28 NOTE — PHYSICAL EXAM
[Normal] : mucosa is normal [Midline] : trachea located in midline position [Nasal Endoscopy Performed] : nasal endoscopy was performed, see procedure section for findings [] : septum deviated bilaterally

## 2021-10-28 NOTE — ASSESSMENT
[FreeTextEntry1] : Patient with hx of hearing loss with hearing aids complains of constant cough x 1 year \par \par Cough due to Deviated septum and Chr Rhinitis and PND:\par -Continue Benadryl, Tensilon pearls and nasal sprays \par \par vestibulopathy:\par -continue using cane to ambulate \par -continue vestibular rehab \par \par Bilateral SNHL:\par -cleared for hearing aids\par \par f/u prn \par

## 2021-10-28 NOTE — PROCEDURE
[FreeTextEntry6] : Anterior Rhinoscopy insufficient to account for symptoms.\par \par After informed verbal consent is obtained, the fiberoptic nasal endoscope #23 is passed via the right nasal cavity.\par The following anatomic sites were directly examined in a sequential fashion:\par The scope was introduced in both  nasal passages between the middle and inferior turbinates to exam the inferior portion of the middle meatus and the fontanelle, as well as the maxillary ostia.  Next, the scope was passed medially and posteriorly to the middle turbinates to examine the sphenoethmoid recess and the superior turbinate region.\par  \par \par   There is [ 0 ]% obstruction of the nasopharynx with adenoid tissue.\par \par A deviated nasal septum was noted causing obstruction.\par The turbinates were congested-bilateral.\par \par Right Side:\par ·               Mucosa: wnl\par ·               Mucous: none\par ·               Polyp: none\par ·               Inferior Turbinate: sl congested\par ·               Middle Turbinate:sl congested\par ·               Superior Turbinate: wnl\par ·               Inferior Meatus:clear\par ·               Middle Meatus: clear\par ·               Super Meatus: clear\par ·               Sphenoethmoidal Recess: wnl\par \par \par \par Left Side:\par ·               Mucosa: wnl\par ·               Mucous:none\par ·               Polyp: none\par ·               Inferior Turbinate: sl congested\par ·               Middle Turbinate: sl congested\par ·               Superior Turbinate:wnl\par ·               Inferior Meatus: clear\par ·               Middle Meatus: clear\par ·               Super Meatus:clear\par ·               Sphenoethmoidal Recess: wnl\par \par

## 2021-11-03 ENCOUNTER — APPOINTMENT (OUTPATIENT)
Dept: INTERNAL MEDICINE | Facility: CLINIC | Age: 86
End: 2021-11-03
Payer: MEDICARE

## 2021-11-03 ENCOUNTER — APPOINTMENT (OUTPATIENT)
Dept: RADIOLOGY | Facility: CLINIC | Age: 86
End: 2021-11-03
Payer: MEDICARE

## 2021-11-03 ENCOUNTER — OUTPATIENT (OUTPATIENT)
Dept: OUTPATIENT SERVICES | Facility: HOSPITAL | Age: 86
LOS: 1 days | End: 2021-11-03
Payer: MEDICARE

## 2021-11-03 VITALS
SYSTOLIC BLOOD PRESSURE: 140 MMHG | HEIGHT: 61.5 IN | OXYGEN SATURATION: 98 % | BODY MASS INDEX: 40.63 KG/M2 | DIASTOLIC BLOOD PRESSURE: 80 MMHG | WEIGHT: 218 LBS | HEART RATE: 87 BPM | TEMPERATURE: 97.7 F

## 2021-11-03 DIAGNOSIS — R07.81 PLEURODYNIA: ICD-10-CM

## 2021-11-03 DIAGNOSIS — R05.9 COUGH, UNSPECIFIED: ICD-10-CM

## 2021-11-03 DIAGNOSIS — Z96.659 PRESENCE OF UNSPECIFIED ARTIFICIAL KNEE JOINT: Chronic | ICD-10-CM

## 2021-11-03 DIAGNOSIS — Z96.60 PRESENCE OF UNSPECIFIED ORTHOPEDIC JOINT IMPLANT: Chronic | ICD-10-CM

## 2021-11-03 DIAGNOSIS — G56.00 CARPAL TUNNEL SYNDROME, UNSPECIFIED UPPER LIMB: Chronic | ICD-10-CM

## 2021-11-03 DIAGNOSIS — H26.9 UNSPECIFIED CATARACT: Chronic | ICD-10-CM

## 2021-11-03 PROCEDURE — 71100 X-RAY EXAM RIBS UNI 2 VIEWS: CPT

## 2021-11-03 PROCEDURE — 71046 X-RAY EXAM CHEST 2 VIEWS: CPT | Mod: 26

## 2021-11-03 PROCEDURE — 71046 X-RAY EXAM CHEST 2 VIEWS: CPT

## 2021-11-03 PROCEDURE — 71100 X-RAY EXAM RIBS UNI 2 VIEWS: CPT | Mod: 26,LT

## 2021-11-03 PROCEDURE — 99214 OFFICE O/P EST MOD 30 MIN: CPT | Mod: 25

## 2021-11-29 ENCOUNTER — RX RENEWAL (OUTPATIENT)
Age: 86
End: 2021-11-29

## 2021-12-01 ENCOUNTER — APPOINTMENT (OUTPATIENT)
Dept: INTERNAL MEDICINE | Facility: CLINIC | Age: 86
End: 2021-12-01

## 2021-12-01 RX ORDER — CLOPIDOGREL BISULFATE 75 MG/1
75 TABLET, FILM COATED ORAL DAILY
Qty: 30 | Refills: 3 | Status: ACTIVE | COMMUNITY
Start: 2021-06-29 | End: 1900-01-01

## 2021-12-07 ENCOUNTER — NON-APPOINTMENT (OUTPATIENT)
Age: 86
End: 2021-12-07

## 2021-12-15 ENCOUNTER — APPOINTMENT (OUTPATIENT)
Dept: INTERNAL MEDICINE | Facility: CLINIC | Age: 86
End: 2021-12-15
Payer: MEDICARE

## 2021-12-15 ENCOUNTER — RX RENEWAL (OUTPATIENT)
Age: 86
End: 2021-12-15

## 2021-12-15 VITALS
HEART RATE: 89 BPM | BODY MASS INDEX: 39.56 KG/M2 | TEMPERATURE: 97.8 F | HEIGHT: 62 IN | WEIGHT: 215 LBS | SYSTOLIC BLOOD PRESSURE: 132 MMHG | OXYGEN SATURATION: 97 % | DIASTOLIC BLOOD PRESSURE: 82 MMHG

## 2021-12-15 DIAGNOSIS — G45.3 AMAUROSIS FUGAX: ICD-10-CM

## 2021-12-15 PROCEDURE — 36415 COLL VENOUS BLD VENIPUNCTURE: CPT

## 2021-12-15 PROCEDURE — 99214 OFFICE O/P EST MOD 30 MIN: CPT | Mod: 25

## 2021-12-28 LAB
ALBUMIN SERPL ELPH-MCNC: 4.3 G/DL
ALP BLD-CCNC: 97 U/L
ALT SERPL-CCNC: 16 U/L
ANION GAP SERPL CALC-SCNC: 16 MMOL/L
AST SERPL-CCNC: 20 U/L
BASOPHILS # BLD AUTO: 0.07 K/UL
BASOPHILS NFR BLD AUTO: 0.8 %
BILIRUB SERPL-MCNC: 0.3 MG/DL
BUN SERPL-MCNC: 22 MG/DL
CALCIUM SERPL-MCNC: 9.9 MG/DL
CHLORIDE SERPL-SCNC: 101 MMOL/L
CO2 SERPL-SCNC: 24 MMOL/L
CREAT SERPL-MCNC: 0.76 MG/DL
EOSINOPHIL # BLD AUTO: 0.32 K/UL
EOSINOPHIL NFR BLD AUTO: 3.9 %
GLUCOSE SERPL-MCNC: 91 MG/DL
HCT VFR BLD CALC: 42.3 %
HGB BLD-MCNC: 14 G/DL
IMM GRANULOCYTES NFR BLD AUTO: 0.4 %
LYMPHOCYTES # BLD AUTO: 1.43 K/UL
LYMPHOCYTES NFR BLD AUTO: 17.2 %
MAN DIFF?: NORMAL
MCHC RBC-ENTMCNC: 31 PG
MCHC RBC-ENTMCNC: 33.1 GM/DL
MCV RBC AUTO: 93.6 FL
MONOCYTES # BLD AUTO: 1.05 K/UL
MONOCYTES NFR BLD AUTO: 12.7 %
NEUTROPHILS # BLD AUTO: 5.39 K/UL
NEUTROPHILS NFR BLD AUTO: 65 %
PLATELET # BLD AUTO: 307 K/UL
POTASSIUM SERPL-SCNC: 4.1 MMOL/L
PROT SERPL-MCNC: 6.5 G/DL
RBC # BLD: 4.52 M/UL
RBC # FLD: 13.8 %
SODIUM SERPL-SCNC: 140 MMOL/L
URATE SERPL-MCNC: 6.1 MG/DL
WBC # FLD AUTO: 8.29 K/UL

## 2022-01-17 PROBLEM — R07.81 RIB PAIN ON LEFT SIDE: Status: ACTIVE | Noted: 2021-11-03

## 2022-01-17 NOTE — HISTORY OF PRESENT ILLNESS
[de-identified] : Patient has a cough which was somewhat wheezy.  There is some phlegm is coming out, which is clear and has no blood.  She takes benzonatate, and also 2 Benadryl at night.  No sick contacts, no travel.  Patient saw Dr. Ramos (ENT) who felt that patient's cough was due to a deviated septum and chronic rhinitis.  No fevers, chills, chest pain, dyspnea, loss of taste/smell.\par \par Patient didn't take the tramadol last night for the first time.  She was able to turn her body.  She uses Tylenol when mild.  PT is coming to the house.\par \par Patient plans to get the Moderna COVID-19 booster this Sunday 11/7/2021.  She received the influenza vaccination about 2 weeks ago.\par \par Patient sometimes gets eye blackouts.  She will return to Dr. Melendez (Neuro).

## 2022-01-17 NOTE — ASSESSMENT
[FreeTextEntry1] : Patient with a cough, and ongoing rib pain.  Patient given a script for a CXR and rib series.  She likely has musculoskeletal pain.  She can use Mucinex DM and benzonatate.  She will call for fevers, chills, dyspnea.

## 2022-01-21 NOTE — HISTORY OF PRESENT ILLNESS
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 For the last year patient having a localized pain and tenderness over the low back and left hip area. Having difficulty standing or walking using a walker Stable.

## 2022-01-25 ENCOUNTER — NON-APPOINTMENT (OUTPATIENT)
Age: 87
End: 2022-01-25

## 2022-01-25 ENCOUNTER — APPOINTMENT (OUTPATIENT)
Dept: CARDIOLOGY | Facility: CLINIC | Age: 87
End: 2022-01-25
Payer: MEDICARE

## 2022-01-25 VITALS — HEART RATE: 80 BPM | DIASTOLIC BLOOD PRESSURE: 80 MMHG | SYSTOLIC BLOOD PRESSURE: 136 MMHG

## 2022-01-25 VITALS
HEIGHT: 62 IN | WEIGHT: 218 LBS | HEART RATE: 85 BPM | DIASTOLIC BLOOD PRESSURE: 81 MMHG | BODY MASS INDEX: 40.12 KG/M2 | OXYGEN SATURATION: 99 % | SYSTOLIC BLOOD PRESSURE: 148 MMHG

## 2022-01-25 PROCEDURE — 36415 COLL VENOUS BLD VENIPUNCTURE: CPT

## 2022-01-25 PROCEDURE — 99215 OFFICE O/P EST HI 40 MIN: CPT

## 2022-01-25 PROCEDURE — 93000 ELECTROCARDIOGRAM COMPLETE: CPT

## 2022-01-25 NOTE — PHYSICAL EXAM
[General Appearance - Well Developed] : well developed [Normal Appearance] : normal appearance [Well Groomed] : well groomed [General Appearance - Well Nourished] : well nourished [No Deformities] : no deformities [General Appearance - In No Acute Distress] : no acute distress [Normal Conjunctiva] : the conjunctiva exhibited no abnormalities [Eyelids - No Xanthelasma] : the eyelids demonstrated no xanthelasmas [Normal Oral Mucosa] : normal oral mucosa [No Oral Pallor] : no oral pallor [No Oral Cyanosis] : no oral cyanosis [Normal Jugular Venous A Waves Present] : normal jugular venous A waves present [Normal Jugular Venous V Waves Present] : normal jugular venous V waves present [No Jugular Venous Brennan A Waves] : no jugular venous brennan A waves [Respiration, Rhythm And Depth] : normal respiratory rhythm and effort [Exaggerated Use Of Accessory Muscles For Inspiration] : no accessory muscle use [Auscultation Breath Sounds / Voice Sounds] : lungs were clear to auscultation bilaterally [Heart Rate And Rhythm] : heart rate and rhythm were normal [Heart Sounds] : normal S1 and S2 [Murmurs] : no murmurs present [Abdomen Soft] : soft [Abdomen Tenderness] : non-tender [Abdomen Mass (___ Cm)] : no abdominal mass palpated [Abnormal Walk] : normal gait [Nail Clubbing] : no clubbing of the fingernails [Cyanosis, Localized] : no localized cyanosis [Petechial Hemorrhages (___cm)] : no petechial hemorrhages [Skin Color & Pigmentation] : normal skin color and pigmentation [] : no rash [No Venous Stasis] : no venous stasis [Skin Lesions] : no skin lesions [No Skin Ulcers] : no skin ulcer [No Xanthoma] : no  xanthoma was observed [Oriented To Time, Place, And Person] : oriented to person, place, and time [Affect] : the affect was normal [Mood] : the mood was normal [No Anxiety] : not feeling anxious [FreeTextEntry1] : Severe edema bilaterally 2/3-3/4 up with only mild pitting.  No skin breakdown just yet..  Pedal pulses 2+

## 2022-01-25 NOTE — CARDIOLOGY SUMMARY
[Ant Wall Defect] : anterior wall defect [___] : [unfilled] [LVEF ___%] : LVEF [unfilled]% [None] : no pulmonary hypertension [Mild] : mild mitral regurgitation [Normal] : normal [___] : [unfilled]

## 2022-01-25 NOTE — HISTORY OF PRESENT ILLNESS
[FreeTextEntry1] : I have been seeing the patient since 2007. At that time she had some dyspnea on exertion but adenosine thallium done preop to hip surgery was felt to be negative. However in 2009 she had increase in symptoms. At that time a stress echo was abnormal in stage II with anterior ischemia. She had responded well to medical therapy and had been stable since then. She did have a syncopal episode in April of 2011 but that seemed to be related to volume depletion and/or vasopressor. That has not recurred. She has had trouble with any statins including Zocor, Crestor,  Pravachol, and Lescol.\par January 2013  Patient returns for followup. She still has class II symptoms with shortness of breath when she walks uphill, although she claimed that if she uses her cane she doesn't get it. She can walk 2.5 miles per hour for 25 minutes on level ground without symptoms. She does not have chest pain. She is able to get around and do her shopping but mostly with the car. Her pedal edema seems to be worse lately. Her orthopedic symptoms continued to progress, and she is considering Knee replacement in June. She is having some change in her vision and is wondering if any of her medications could be causing it.  She underwent knee replacement at the [facility] in July without incident. She started seeing her new internist Dr. Gonzalo Haley on August 28, 2013 and had a lipid profile then with cholesterol 184, triglycerides 106, HDL 68, LDL 95.\par October 7, 2013. Patient returns for followup. 3 weeks ago, she stopped her Livalo because of her proximal muscle weakness with trouble getting up out of a chair. She has not seen much improvement yet which wants to wait a few more weeks before going to a neurologist. She has been getting her usual shortness of breath if she walks level ground now without a cane and even some tightness in her throat that is relieved with rest. However if she uses her cane she has no symptoms. Just today she has swelling and pain in the new knee; she had a wedding last night with a long car ride but cannot recall any trauma. \par January 20, 2014. The patient is here in followup. She has been off Livalo for a while now and her symptoms did resolve. She can step up on a curb and get up off a chair without a problem. However she now notices that if she walks even one block she gets short of breath as well as tightening in her throat which is relieved with rest. If she uses her cane or if she walks on the treadmill at 2.5 miles an hour level she does not get the symptoms. She returned for a stress echocardiogram that was abnormal with shortness of breath, ST depressions, and an anterior wall motion abnormality at 7 METs, . I increased her bisoprolol to 15 mg so she could go to [place] and this did improve her symptoms. She returned from [place] and had cardiac catheterization on February 25 which showed totally normal coronary arteries without any evidence of plaquing. In addition she claims they did some kind of Doppler on her legs (? WEST) which was normal as well. I have no record of that.\par March 10, 2014. The patient returns for followup. She has been feeling fine without significant symptoms even though she cut her bisoprolol back to 10 mg. She would like to come down on her medications as she thinks it may have an effect on her vision getting worse over the last year or so. 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 We agreed to stop Ranexa and she went down to 10 mg on the bisoprolol. She is staying off statins.\par May, 12 2014. The patient is here for followup. Her dyspnea on exertion has gotten much worse even on the walk here from the parking lot. She denies chest pressure or tightness. He is also having some trouble with her balance. She has a dry chronic cough as well not necessarily positional. She has never had a pulmonary workup yet and she is off the Ranexa since her last visit as well as on the lower dose of beta blocker. I again explained to her the issue of syndrome X and having angina with normal coronaries. She has also been unable to tolerate any statins. A VAP profile was sent which showed her to have pattern A LDL and the only high risk findings was an elevated Lp(a). She was referred to pulmonary whom she saw on July 14. He felt her dyspnea could be multifactorial including overweight, orthopedic, and chronotropic incompetence. There was no primary lung disease or airway disease. We tried Ranexa 1000 mg b.i.d. and she clearly had a good response with less shortness of breath initially.\par June 22, 2015. The patient is here in follow up for the first time in over a year. She had been complaining that her shortness of breath was worse now happening even on level ground and not just uphill. However since then she has not done much walking as she has severe sciatica on the right and she has some type of left flank pain that radiated to her abdomen that is being worked up by Dr. Gonzalo Haley. She had CAT scans last week but the results are still not available. She does have an upcoming appointment with a neurologist. She does not do well with NSAIDs as they give her headaches, so she takes Tylenol around the clock 3 times a day. Her latest lipid profile last month with Dr. Haley was similar to previously with a good cholesterol to HDL ratio and a high LDL. She has tried virtually all the statins as well as Zetia and has not tolerated any of them. She did have normal coronaries on cardiac catheterization in February of 2014.\par December 8, 2015. Patient here in followup as her medicines would not be renewed. Most of her problems seem to the orthopedic. She had some issues with UTI and had an endoscopy that showed nothing other than pancreatic cysts that were found on workup. She does have dyspnea on exertion still. It is not clear if the Ranexa and the beta blocker are helping her and giving her side effects. She just started back on the treadmill at 2.5 mi./h and did at least a mile this morning. Her cholesterol is high on her last blood test but she is on no cholesterol medications as she does not tolerate them and on coronary angiography had  totally clear coronary arteries. \par May 16, 2016. The patient is here for followup. Her main problem has been her back and she may need surgery. She has multiple levels of disc problems. She has significant pain and can only do a little bit of walking and a little bit on the bike. She had 3 injections from Dr. Alvarenga that took a while to kick in, but finally did help. She is scheduled for an EMG. She is also concerned about some cysts on her pancreas that are being followed up. No angina. No shortness of breath with exertion, but she is less active than she used to be. Exam and EKG are unremarkable from a cardiac point of view.\par \par November 2016. The patient is scheduled for a right total hip replacement. She had a medical clearance per Dr. Gonzalo Haley, her internist. I did not feel that she needed a cardiac clearance as well but after reviewing her records, her orthopedist wanted a cardiac evaluation and clearance as well. Briefly, the patient has had a history of dyspnea on exertion that sounded like an anginal equivalent. She had two abnormal stress echoes and after the second she underwent coronary angiography, which showed normal coronary arteries without plaque or obstruction. She either has syndrome X which is angina with normal coronary arteries, or her dyspnea on exertion has nothing to do with her heart and the stress echoes are false Positive. In either case, the prognosis is very good and she should have no contraindication to the upcoming surgery. She has high cholesterol, but has not tolerated any statins, and subsequent lipid panel particle breakdown was noted to have large buoyant LDL, type A, which places her in a low risk group, despite the elevated LDL number. I reviewed her EKG from Dr. Haley and from presurgical testing both of which are normal. I reviewed her labs, which are acceptable. I do not find that she is at cardiac risk for the upcoming surgery and anesthesia.\par May 3, 2017. First visit back since her hip surgery. Fairly stable. Still having trouble walking, which could be related to the other hip or some other orthopedic issue and she will be seeing both her hip surgeon and her general orthopedist, relatively soon. Occasional shortness of breath with exertion, but no chest pain. Nocturnal cough, which could be reflux, but she didn't think so and did not want to try PPI. No intrerval hospitalizations and no change in her medication. She saw Dr. Gonzalo Haley in January and had a venous Doppler of the leg that was negative.\par September 13, 2017. The patient here in followup along with her . No change. Still gets short of breath with exertion. Still doesn't walk great. Not taking statins as not tolerated, but also normal coronary arteries as mentioned.\par March 12, 2018. Patient here in followup. 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 Was lethargic and somewhat sick during much of the winter that probably was influenza along with her . Still has her chronic dyspnea on exertion and some chest pain, which she assumes is weight and muscular, but could be her angina with normal coronaries. \par October 8, 2018. The patient here in followup together with her . Remains in sinus rhythm with left anterior hemiblock and poor R wave progression. Trouble with left knee, old replacement (23 years).\par \par June 30, 2021.On June 28 the patient went to her ophthalmologist Dr. Morelos after her fourth episode of amaurosis fugax over the last few weeks.  He insisted on her going to the emergency room and rule out a stroke and TIA etc.  She remained overnight at [facility].  She had a CAT scan and MRI and a CT angio as well as monitoring.  Dr. Shepherd of neurology reviewed the studies and spoke with me today.  Absolutely no evidence of atherosclerosis.  No evidence of stroke.  No evidence of atrial fibrillation.  Unlikely that this could be caused by atrial fibrillation where you would have four embolic episodes all going to the same exact place and causing reversible changes etc.  Therefore he believes she needs a fluorescein angiogram and to see a retina specialist and that the stroke and embolic work-up has been negative.  In the meantime she remains on Plavix because aspirin upsets her stomach.  She refused to take the statin that she was prescribed at [facility] because she did not tolerate any statins that we have tried over the years and again it does not look like there is any atherosclerosis involved; similarly when we did coronary angiography on her a few years ago she had clean coronaries as well.  I reviewed all of this with the patient's daughter and they will schedule an appointment with the retina specialist. \par July 6, 2021.  First visit in almost 3 years.  She had a brief hospitalization after amaurosis fugax (see chart note).\par She brought records from [facility] dated June 28, 2021.  Normal CBC.  BUN 27 creatinine 1.0 potassium 3.5.  Normal LFTs.  Hemoglobin A1c 5.9.  Negative troponin.  Cholesterol 257, triglycerides 173, HDL 71, .  CT head without contrast negative.  MRI June 29 with prominent cerebral volume loss but no evidence of prior ischemic event.  Otherwise normal.  MRI of brain and orbits with normal pituitary gland.  Ventricles not enlarged.  No evidence of CVA.  No significant small vessel white matter disease present.  At least moderate cerebral and cerebellar volume loss.  Cerebellum and brainstem otherwise intact.  Cerebellopontine angles and IACs are unremarkable.  Bilateral cataract surgery noted but no evidence of intracranial or orbital mass.  Retro-orbital adipose tissue normal and no significant sinus disease.  Chest x-ray unremarkable except some blunting of left costophrenic sulcus.  CTA with no large vessel occlusion.  No aneurysm.  No vasculitis.  Vascularity of the orbits within normal limits.  No internal carotid artery stenosis or occlusion.  Intact carotid and vertebral artery systems.  Advanced degenerative disc disease.  Patient discharged on clopidogrel because she does not tolerate aspirin.  She was recommended statins but refused because she did not tolerate them in the past and in fact given the absence of any atherosclerosis it is reasonable to stop.  She was advised by the neurologist and by me to see a retina specialist.  EKG here is sinus rhythm at 82 and totally within normal limits with a left anterior hemiblock. \par December 7, 2021.Patient called because itching all over a lot.  May be chronic although worse now and she thinks possibly it started with the clopidogrel but she is not sure.  It is the newest medicine she is on although she has been on it for a while now.  As it is it is not clear that she has indication for clopidogrel as neurology work-up did not seem to indicate her episodes were TIAs so it is okay to stop it for now and see if the itching resolves.  If not she could probably go back on it. \par (I spoke with Dr. Holt and she continues to occasionally have some minor episodes on the right side when she talks on her cell phone but again nothing that sounds vascular or embolic.  Her itching finally resolved with multiple treatments, probably had nothing to do with the clopidogrel but currently she is on no antiplatelet agents.)\par January 25, 2022.  Returns here with her daughter.  She had developed gout so Dr. Haley treated her, she is still on colchicine now once a day, and he stopped the other diuretics and gave her just furosemide 20 daily.  Meanwhile she has severe bilateral pedal edema but when she wakes up in the morning after lying flat in bed all night the swelling is way down.  However she finds it too uncomfortable to keep her legs elevated during the day.  Exceedingly long discussion with her and her daughter about seeing vascular for work-up of venous insufficiency, whether she is a candidate for laser therapy, however she just needs lymphedema massage therapy followed by vascular stockings.  By the end I considered adding spironolactone 25 to her furosemide 20 while stopping the potassium, and referring her to Dr. Mcallister of vascular.

## 2022-01-25 NOTE — REVIEW OF SYSTEMS
[Dyspnea on exertion] : dyspnea during exertion [Lower Ext Edema] : lower extremity edema [Joint Pain] : joint pain [Joint Stiffness] : joint stiffness [Negative] : Heme/Lymph [Weight Gain (___ Lbs)] : no recent weight gain [Weight Loss (___ Lbs)] : [unfilled] ~Ulb weight loss [Chest Discomfort] : no chest discomfort [Leg Claudication] : no intermittent leg claudication [Syncope] : no syncope [FreeTextEntry2] : Actually down 8 pounds. [FreeTextEntry3] : see HPI [FreeTextEntry9] : severe DJD on MRI [de-identified] : see HPI.,  Chronic trouble with her balance

## 2022-01-25 NOTE — REASON FOR VISIT
[FreeTextEntry1] : 86-year-old woman with angina and normal coronary arteries is here in followup with main issue now severe pedal edema

## 2022-01-26 ENCOUNTER — RX RENEWAL (OUTPATIENT)
Age: 87
End: 2022-01-26

## 2022-01-26 LAB
ALBUMIN SERPL ELPH-MCNC: 4.6 G/DL
ALP BLD-CCNC: 109 U/L
ALT SERPL-CCNC: 18 U/L
ANION GAP SERPL CALC-SCNC: 17 MMOL/L
AST SERPL-CCNC: 21 U/L
BASOPHILS # BLD AUTO: 0.08 K/UL
BASOPHILS NFR BLD AUTO: 1.4 %
BILIRUB SERPL-MCNC: 0.4 MG/DL
BUN SERPL-MCNC: 22 MG/DL
CALCIUM SERPL-MCNC: 10.2 MG/DL
CHLORIDE SERPL-SCNC: 100 MMOL/L
CHOLEST SERPL-MCNC: 247 MG/DL
CO2 SERPL-SCNC: 25 MMOL/L
CREAT SERPL-MCNC: 0.78 MG/DL
EOSINOPHIL # BLD AUTO: 0.18 K/UL
EOSINOPHIL NFR BLD AUTO: 3.3 %
GLUCOSE SERPL-MCNC: 89 MG/DL
HCT VFR BLD CALC: 41.1 %
HDLC SERPL-MCNC: 69 MG/DL
HGB BLD-MCNC: 13.8 G/DL
IMM GRANULOCYTES NFR BLD AUTO: 0.4 %
LDLC SERPL CALC-MCNC: 151 MG/DL
LYMPHOCYTES # BLD AUTO: 1.38 K/UL
LYMPHOCYTES NFR BLD AUTO: 25 %
MAN DIFF?: NORMAL
MCHC RBC-ENTMCNC: 30.7 PG
MCHC RBC-ENTMCNC: 33.6 GM/DL
MCV RBC AUTO: 91.3 FL
MONOCYTES # BLD AUTO: 0.74 K/UL
MONOCYTES NFR BLD AUTO: 13.4 %
NEUTROPHILS # BLD AUTO: 3.12 K/UL
NEUTROPHILS NFR BLD AUTO: 56.5 %
NONHDLC SERPL-MCNC: 177 MG/DL
NT-PROBNP SERPL-MCNC: 73 PG/ML
PLATELET # BLD AUTO: 275 K/UL
POTASSIUM SERPL-SCNC: 3.9 MMOL/L
PROT SERPL-MCNC: 6.8 G/DL
RBC # BLD: 4.5 M/UL
RBC # FLD: 14.2 %
SODIUM SERPL-SCNC: 142 MMOL/L
TRIGL SERPL-MCNC: 130 MG/DL
TSH SERPL-ACNC: 2.11 UIU/ML
URATE SERPL-MCNC: 5.8 MG/DL
WBC # FLD AUTO: 5.52 K/UL

## 2022-01-26 RX ORDER — POTASSIUM CHLORIDE 1500 MG/1
20 TABLET, EXTENDED RELEASE ORAL
Qty: 180 | Refills: 1 | Status: DISCONTINUED | COMMUNITY
Start: 2017-04-28 | End: 2022-01-26

## 2022-01-28 ENCOUNTER — RX RENEWAL (OUTPATIENT)
Age: 87
End: 2022-01-28

## 2022-02-01 PROBLEM — G45.3 AMAUROSIS FUGAX: Status: ACTIVE | Noted: 2021-06-29

## 2022-02-01 NOTE — ASSESSMENT
[FreeTextEntry1] : (1) Gout - patient to minimize meat in her diet, as it can trigger the gout.  She can take colchicine 0.6mg once daily for now.  Labs drawn in office as below - will include uric acid as a baseline.  Will consider allopurinol at a later date.  Her thiazide diuretic was discontinued, and she had furosemide added as she is concerned about leg swelling developing.\par \par (2) CV - patient s/p episode of amaurosis fugax.  She declines statins.  She was on clopidogrel, temporarily on hold due to itching.  BP adequately controlled.  Follow-up with Dr. Arora.\par \par (3) PT script given on request for abnormality of gait.

## 2022-02-01 NOTE — HISTORY OF PRESENT ILLNESS
[de-identified] : Patient presents for follow-up of hypertension.  She had an episode of amaurosis fugax earlier this year, and is on clopidogrel.  She had some itching and discussed this with Dr. Arora, who suggested holding the clopidogrel.  For her hyperlipidemia, patient declines statins.\par \par Patient says that her ribs are gradually improving since her fall in late October 2021.  The pain is improved overall.\par \par Patient had a 3rd dose of the Moderna COVID-19 vaccine on 11/7/2021.  She had an influenza vaccination late Oct 2021.\par \par Patient says she had gout starting this past Sunday 12/11/2021.  The right 1st toe was swollen, and there was ankle swelling.  The area was painful.  She had previously had some meat (spare ribs, meat loaf, chicken).  She took colchicine, and repeated it an hour later.  She took 2 more doses on Monday 12/13/2021, and one dose on Tuesday 12/14/2021.  Before this episode, the last gout attack was a few months ago.

## 2022-03-08 ENCOUNTER — RX RENEWAL (OUTPATIENT)
Age: 87
End: 2022-03-08

## 2022-03-08 DIAGNOSIS — M62.81 MUSCLE WEAKNESS (GENERALIZED): ICD-10-CM

## 2022-03-09 ENCOUNTER — APPOINTMENT (OUTPATIENT)
Dept: VASCULAR SURGERY | Facility: CLINIC | Age: 87
End: 2022-03-09
Payer: MEDICARE

## 2022-03-09 ENCOUNTER — APPOINTMENT (OUTPATIENT)
Dept: INTERNAL MEDICINE | Facility: CLINIC | Age: 87
End: 2022-03-09
Payer: MEDICARE

## 2022-03-09 VITALS
HEART RATE: 86 BPM | SYSTOLIC BLOOD PRESSURE: 143 MMHG | WEIGHT: 219 LBS | HEIGHT: 62 IN | DIASTOLIC BLOOD PRESSURE: 76 MMHG | BODY MASS INDEX: 40.3 KG/M2

## 2022-03-09 PROCEDURE — 93923 UPR/LXTR ART STDY 3+ LVLS: CPT

## 2022-03-09 PROCEDURE — 93970 EXTREMITY STUDY: CPT

## 2022-03-09 PROCEDURE — 36415 COLL VENOUS BLD VENIPUNCTURE: CPT

## 2022-03-09 PROCEDURE — 99203 OFFICE O/P NEW LOW 30 MIN: CPT

## 2022-03-09 NOTE — CONSULT LETTER
[Dear  ___] : Dear  [unfilled], [Consult Letter:] : I had the pleasure of evaluating your patient, [unfilled]. [Please see my note below.] : Please see my note below. [Sincerely,] : Sincerely, [FreeTextEntry3] : Torrie Lema PA-C\par [facility]\par

## 2022-03-09 NOTE — HISTORY OF PRESENT ILLNESS
[FreeTextEntry1] : 88 yo female with a history of gout, oa s/p b/l knee and hip replacement presents for evaluation of b/l lower extremity edema.  pt was refered by cardiology for further evaluation.  pt reports she recently was treated for gout after episode of b/l lower extremity edema and erythema resolved with steroid and now is on choltrisine.  pt reports chronic lower extremity edema that improves with elevation but given history of arthritis elevating the legs has become uncomfortable pt also unable to bed to apply the stockings.  pt denies any history of dvt, ulcers or wounds of the lower extremities.  pt also reports weakness in the legs that limits her ability to ambulate further then 30 feet prior to having to stop and rest  and pain in the evening is positional.

## 2022-03-09 NOTE — ASSESSMENT
[FreeTextEntry1] : 86 yo female with a history of gout, oa s/p b/l knee and hip replacement presents for evaluation of b/l lower extremity edema.\par \par venous duplex shows no evidence of dvt, or svt, insufficiency noted in the left gsv only \par josefa/pvr shows no evidence of significant arterial disease \par \par at this time would recommend compression stockings and elevation in addition to weight loss \par given symptoms are bilateral and venous insufficiency was noted only on the left would not recommend any surgical intervention \par lymphedema therapy was also discussed and pt decided that she would likely not tolerate compression wraps.  \par \par pt to follow up as needed

## 2022-03-09 NOTE — PHYSICAL EXAM
[JVD] : jugular venous distention ~L [0] : left 0 [Ankle Swelling (On Exam)] : present [Ankle Swelling Bilaterally] : severe [Varicose Veins Of Lower Extremities] : bilaterally [Ankle Swelling On The Left] : moderate [No Rash or Lesion] : No rash or lesion [Alert] : alert [Calm] : calm [] : not present [Skin Ulcer] : no ulcer [de-identified] : appears well  [de-identified] : tenderness with palpation of b/l calves and anterior shin

## 2022-03-14 LAB
ANION GAP SERPL CALC-SCNC: 15 MMOL/L
BUN SERPL-MCNC: 24 MG/DL
CALCIUM SERPL-MCNC: 9.7 MG/DL
CHLORIDE SERPL-SCNC: 101 MMOL/L
CO2 SERPL-SCNC: 25 MMOL/L
CREAT SERPL-MCNC: 0.82 MG/DL
EGFR: 70 ML/MIN/1.73M2
GLUCOSE SERPL-MCNC: 103 MG/DL
MAGNESIUM SERPL-MCNC: 1.6 MG/DL
POTASSIUM SERPL-SCNC: 4 MMOL/L
SODIUM SERPL-SCNC: 141 MMOL/L

## 2022-04-14 ENCOUNTER — RX RENEWAL (OUTPATIENT)
Age: 87
End: 2022-04-14

## 2022-05-18 ENCOUNTER — APPOINTMENT (OUTPATIENT)
Dept: INTERNAL MEDICINE | Facility: CLINIC | Age: 87
End: 2022-05-18

## 2022-06-01 ENCOUNTER — APPOINTMENT (OUTPATIENT)
Dept: INTERNAL MEDICINE | Facility: CLINIC | Age: 87
End: 2022-06-01
Payer: MEDICARE

## 2022-06-01 PROCEDURE — 99442: CPT | Mod: 95

## 2022-06-08 ENCOUNTER — APPOINTMENT (OUTPATIENT)
Dept: INTERNAL MEDICINE | Facility: CLINIC | Age: 87
End: 2022-06-08

## 2022-07-14 ENCOUNTER — RX RENEWAL (OUTPATIENT)
Age: 87
End: 2022-07-14

## 2022-07-18 ENCOUNTER — APPOINTMENT (OUTPATIENT)
Dept: INTERNAL MEDICINE | Facility: CLINIC | Age: 87
End: 2022-07-18

## 2022-07-18 ENCOUNTER — RX RENEWAL (OUTPATIENT)
Age: 87
End: 2022-07-18

## 2022-07-18 PROCEDURE — 99442: CPT | Mod: 95

## 2022-08-03 ENCOUNTER — APPOINTMENT (OUTPATIENT)
Dept: INTERNAL MEDICINE | Facility: CLINIC | Age: 87
End: 2022-08-03

## 2022-08-03 ENCOUNTER — APPOINTMENT (OUTPATIENT)
Dept: RADIOLOGY | Facility: CLINIC | Age: 87
End: 2022-08-03

## 2022-08-03 VITALS
WEIGHT: 230 LBS | OXYGEN SATURATION: 97 % | TEMPERATURE: 97.4 F | SYSTOLIC BLOOD PRESSURE: 120 MMHG | DIASTOLIC BLOOD PRESSURE: 70 MMHG | RESPIRATION RATE: 14 BRPM | HEART RATE: 89 BPM | HEIGHT: 62 IN | BODY MASS INDEX: 42.33 KG/M2

## 2022-08-03 PROCEDURE — 36415 COLL VENOUS BLD VENIPUNCTURE: CPT

## 2022-08-03 PROCEDURE — 77080 DXA BONE DENSITY AXIAL: CPT

## 2022-08-03 PROCEDURE — G0439: CPT

## 2022-08-03 PROCEDURE — G0444 DEPRESSION SCREEN ANNUAL: CPT | Mod: 59

## 2022-08-03 PROCEDURE — 99213 OFFICE O/P EST LOW 20 MIN: CPT | Mod: 25

## 2022-08-03 RX ORDER — METHYLPREDNISOLONE 16 MG/1
16 TABLET ORAL
Qty: 6 | Refills: 0 | Status: COMPLETED | COMMUNITY
Start: 2021-08-11 | End: 2022-08-03

## 2022-08-04 NOTE — HEALTH RISK ASSESSMENT
[Very Good] : ~his/her~ current health as very good [Good] : ~his/her~  mood as  good [Former] : Former [No] : In the past 12 months have you used drugs other than those required for medical reasons? No [0] : 1) Little interest or pleasure doing things: Not at all (0) [1] : 2) Feeling down, depressed, or hopeless for several days (1) [PHQ-2 Negative - No further assessment needed] : PHQ-2 Negative - No further assessment needed [Patient reported mammogram was normal] : Patient reported mammogram was normal [Patient reported bone density results were abnormal] : Patient reported bone density results were abnormal [HIV test declined] : HIV test declined [Hepatitis C test declined] : Hepatitis C test declined [None] : None [Alone] : lives alone [With Family] : lives with family [Retired] : retired [] :  [Fully functional (bathing, dressing, toileting, transferring, walking, feeding)] : Fully functional (bathing, dressing, toileting, transferring, walking, feeding) [Fully functional (using the telephone, shopping, preparing meals, housekeeping, doing laundry, using] : Fully functional and needs no help or supervision to perform IADLs (using the telephone, shopping, preparing meals, housekeeping, doing laundry, using transportation, managing medications and managing finances) [Reports changes in hearing] : Reports changes in hearing [Smoke Detector] : smoke detector [Carbon Monoxide Detector] : carbon monoxide detector [Seat Belt] :  uses seat belt [With Patient/Caregiver] : , with patient/caregiver [Reviewed updated] : Reviewed, updated [Designated Healthcare Proxy] : Designated healthcare proxy [Name: ___] : Health Care Proxy's Name: [unfilled]  [Relationship: ___] : Relationship: [unfilled] [Two or more falls in past year] : Patient reported two or more falls in the past year [Feels Safe at Home] : Feels safe at home [de-identified] : none [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  [YearQuit] : 1965 [de-identified] : PT [de-identified] : Balanced  [de-identified] : Has shower bar.   2 falls. [de-identified] : 2 [[id number]] : 1 [Change in mental status noted] : No change in mental status noted [Language] : denies difficulty with language [Behavior] : denies difficulty with behavior [Handling Complex Tasks] : denies difficulty handling complex tasks [Reasoning] : denies difficulty with reasoning [Sexually Active] : not sexually active [Reports changes in vision] : Reports no changes in vision [Reports changes in dental health] : Reports no changes in dental health [Sunscreen] : does not use sunscreen [MammogramDate] : 01/16 [MammogramComments] : 1/20/2016 [BoneDensityComments] : 1/3/2019 - spine +4.6 (but DJD limits accuracy), L wrist -1.2.  Hip not done. [ColonoscopyComments] : Age >75 [de-identified] : Not driving a car currently (stopped when the pandemic started). [de-identified] : Hearing aids ([facility]). [de-identified] : Dr. Baker ([place] towns). [de-identified] : Going regularly. [AdvancecareDate] : 7/21 [FreeTextEntry4] : Health Care Proxy from 1/17/2019 on chart.

## 2022-08-04 NOTE — PHYSICAL EXAM
[No Acute Distress] : no acute distress [Well Nourished] : well nourished [Well Developed] : well developed [Well-Appearing] : well-appearing [Normal Voice/Communication] : normal voice/communication [Normal Sclera/Conjunctiva] : normal sclera/conjunctiva [PERRL] : pupils equal round and reactive to light [EOMI] : extraocular movements intact [Normal Outer Ear/Nose] : the outer ears and nose were normal in appearance [Normal Oropharynx] : the oropharynx was normal [Normal TMs] : both tympanic membranes were normal [No JVD] : no jugular venous distention [No Lymphadenopathy] : no lymphadenopathy [Supple] : supple [Thyroid Normal, No Nodules] : the thyroid was normal and there were no nodules present [No Respiratory Distress] : no respiratory distress  [No Accessory Muscle Use] : no accessory muscle use [Clear to Auscultation] : lungs were clear to auscultation bilaterally [Normal Rate] : normal rate  [Regular Rhythm] : with a regular rhythm [Normal S1, S2] : normal S1 and S2 [No Murmur] : no murmur heard [No Carotid Bruits] : no carotid bruits [No Abdominal Bruit] : a ~M bruit was not heard ~T in the abdomen [No Varicosities] : no varicosities [Pedal Pulses Present] : the pedal pulses are present [No Edema] : there was no peripheral edema [No Palpable Aorta] : no palpable aorta [No Extremity Clubbing/Cyanosis] : no extremity clubbing/cyanosis [Normal Appearance] : normal in appearance [No Nipple Discharge] : no nipple discharge [No Axillary Lymphadenopathy] : no axillary lymphadenopathy [Soft] : abdomen soft [Non Tender] : non-tender [Non-distended] : non-distended [No Masses] : no abdominal mass palpated [No HSM] : no HSM [Normal Bowel Sounds] : normal bowel sounds [No Hernias] : no hernias [Normal Posterior Cervical Nodes] : no posterior cervical lymphadenopathy [Normal Anterior Cervical Nodes] : no anterior cervical lymphadenopathy [No CVA Tenderness] : no CVA  tenderness [No Spinal Tenderness] : no spinal tenderness [No Joint Swelling] : no joint swelling [Grossly Normal Strength/Tone] : grossly normal strength/tone [No Rash] : no rash [Coordination Grossly Intact] : coordination grossly intact [No Focal Deficits] : no focal deficits [Normal Gait] : normal gait [Deep Tendon Reflexes (DTR)] : deep tendon reflexes were 2+ and symmetric [Normal Affect] : the affect was normal [Normal Insight/Judgement] : insight and judgment were intact

## 2022-08-04 NOTE — ASSESSMENT
[FreeTextEntry1] : (1) HCM - discuss diet, exercise, weight maintenance.  Labs drawn in office as below.  HIV/Hepatitis C screen declined.  Patient received 4 doses of the Moderna COVID-19 vaccine.  She received the influenza vaccination last season.  Tdap UTD from 7/21/2021.  She received Prevnar-13 10/14/2015 and Pneumovax 11/19/2018.  She declines Shingrix.  Last mammogram 2016, and patient has discontinued screening as well.  Follow-up with Gyn as needed.  Colon cancer screening can be discontinued due to age.  Bone density showed osteopenia at the wrist in 2019, limited evaluation of spine due to DJD of the spine and overlapping structures, and hip was not able to be done.  Patient amenable to going for a follow-up bone density this year.  Health Care Proxy on chart.\par \par (2) CV - patient with hypertension and hyperlipidemia.  She is on furosemide 20mg daily and spironolactone 25mg daily with adequate BP control.  Her thiazide diuretic was discontinued as she had a gout attack.  She did not tolerate statins, and is not on medication for her cholesterol.  She had several episodes of amaurosis fugax in 2021, but work-up did not show atherosclerosis.  A statin was still recommended, but she declined.  She was on Plavix, and Dr. Arora recommended discontinuing the Plavix as there was no atherosclerosis on her work-up.   Follow-up with Dr. Arora as directed.\par \par (3) Allergic rhinitis - continue Claritin or Benadryl.  She has Flonase, although not using it at this time.\par \par (4) DJD - patient walking with a cane, and has home PT.  She sees a pain management specialist and has received several epidurals.  Physiatry referral given for ongoing management.\par \par (5) Venous insufficiency - leg elevation, support stockings.\par \par (6) GI - patient with h/o IPMN (pancreatic cyst), and can follow-up with GI as directed.  She also has GERD, currently not using medication.

## 2022-08-04 NOTE — HISTORY OF PRESENT ILLNESS
[Other: _____] : [unfilled] [de-identified] : TAY LAZO is an 85 year old woman with a history of hypertension, seasonal allergies on Claritin, DJD (s/p L THR, B/L TKRs) who presents for physical. She uses a cane to ambulate.  She sees Dr Sven Anderson (pain management), and has had spinal epidurals.  A physical therapist comes to the home.  She had a gout attack in 2022, and she saw a rheumatologist (Dr. Lydia Blankenship) who recommended starting allopurinol with colchicine (July 2022).  Patient sees Dr. Arora (CV) regularly.  She was sent to vascular in Mar 2022 for LE edema, and it was felt to be venous insufficiency.  Patient declines wearing stockings.  She sees Dr. Vogel (GI) for follow-up of cystic pancreas lesions.  Her spouse passed away in May 2021, and she has been coping well.  She had a positive COVID-19 antibody in June 2020 (pre-vaccination).  Her daughter previously indicated that she was also positive and likely the source.  Patient has no chest pains or dyspnea on exertion. \par \par Patient was seen in [place] ER after 4 episodes of amaurosis fugax in 1 month (June 2021).  Patient says her right eye blacked out a few times over several weeks.  Her ophthalmologist (Dr. Stewart) sent her to the ER.  Work-up negative, and no indication of atherosclerosis (including MRI brain.  Patient is on Plavix 75mg daily as she didn't tolerate ASA.  She declined statins as she didn't tolerate them in the past.  She was sent to a retina specialist (Dr. Pedro Holt).\par \par For her rhinitis, patient is taking Benadryl, and not using Flonase.  She has a chronic cough, with occasional morning phlegm that is clear to yellow (no blood).\par \par Patient received a 2nd booster of the Moderna COVID-19 vaccine 7/20/2022.  She declines Shingrix.  She received an influenza vaccination Fall 2021.\par \par Patient says she sometimes gets a cold feeling in the toes.\par \par She had 3 epidurals with Ena Anderson.  She was sent to Dr. Rebolledo (spine).\par \par Patient is taking an OTC potassium pill.

## 2022-09-06 ENCOUNTER — RX RENEWAL (OUTPATIENT)
Age: 87
End: 2022-09-06

## 2022-09-13 ENCOUNTER — APPOINTMENT (OUTPATIENT)
Dept: PHYSICAL MEDICINE AND REHAB | Facility: CLINIC | Age: 87
End: 2022-09-13

## 2022-09-13 VITALS
TEMPERATURE: 97.2 F | HEART RATE: 80 BPM | DIASTOLIC BLOOD PRESSURE: 85 MMHG | OXYGEN SATURATION: 97 % | SYSTOLIC BLOOD PRESSURE: 170 MMHG

## 2022-09-13 PROCEDURE — 99204 OFFICE O/P NEW MOD 45 MIN: CPT

## 2022-09-13 NOTE — PHYSICAL EXAM
[Normal] : Oriented to person, place, and time, insight and judgement were intact and the affect was normal [de-identified] : no distress [de-identified] : well perfused; 2+ edema of LEs [de-identified] : soft, ND [de-identified] : amb w bl quad canes - turns feet out, step to gait, able to clear foot, somewhat wide based.  [de-identified] : Motor 5/5 bl UE and LEs, No clonus, DTRs trace, sensation intact, proprioception nml

## 2022-09-13 NOTE — ASSESSMENT
[FreeTextEntry1] : Gait Dysfunciton- likely multiffactorial- differential dx includes;\par \par 1) Spinal Cord Compression- reports urinary frequency although no myelopathic signs on examination- family to forward imaging.\par \par 2) Neuropathy- Patient planning to see neurologist- can consider EMG/NCS. Discussed appropriate nutrition.\par \par 3) Orthopedic- Has h/o bl TKA and bl BENNY- however, does not report significant pain so therefore less likely due to issue with hardware- can consider f/u w ortho.\par \par Advised that she should try outpatient PT program but patient is hesitant as she states it is difficult for her to get out of the house. Family will forward imaging reports and f/u w neurology and then we can discuss further recommendations. \par Discussed plan and answered multiple questions.

## 2022-09-13 NOTE — HISTORY OF PRESENT ILLNESS
[FreeTextEntry1] : Patient is an 88 yo female with persistent worsening gait- reports that she has had worsened gait over the past several years.\par Has h/o of bl TKA and bl BENNY dating back several years.\par Reports had an MRI of the spine (uncertain what part of the spine) which she does not have available.\par States of late she has had worsening urgency of urine.\par Follows w Dr. Anderson, pain management, for ESIs.\par Accompanied by daughter.

## 2022-09-19 ENCOUNTER — APPOINTMENT (OUTPATIENT)
Dept: INTERNAL MEDICINE | Facility: CLINIC | Age: 87
End: 2022-09-19

## 2022-09-19 ENCOUNTER — RX RENEWAL (OUTPATIENT)
Age: 87
End: 2022-09-19

## 2022-09-19 LAB
25(OH)D3 SERPL-MCNC: 45.5 NG/ML
ALBUMIN SERPL ELPH-MCNC: 4.4 G/DL
ALP BLD-CCNC: 95 U/L
ALT SERPL-CCNC: 18 U/L
ANION GAP SERPL CALC-SCNC: 11 MMOL/L
APPEARANCE: CLEAR
AST SERPL-CCNC: 22 U/L
BACTERIA: NEGATIVE
BASOPHILS # BLD AUTO: 0.06 K/UL
BASOPHILS NFR BLD AUTO: 0.8 %
BILIRUB SERPL-MCNC: 0.4 MG/DL
BILIRUBIN URINE: NEGATIVE
BLOOD URINE: NEGATIVE
BUN SERPL-MCNC: 18 MG/DL
CALCIUM SERPL-MCNC: 10 MG/DL
CHLORIDE SERPL-SCNC: 102 MMOL/L
CHOLEST SERPL-MCNC: 237 MG/DL
CO2 SERPL-SCNC: 28 MMOL/L
COLOR: YELLOW
CREAT SERPL-MCNC: 0.88 MG/DL
EGFR: 64 ML/MIN/1.73M2
EOSINOPHIL # BLD AUTO: 0.23 K/UL
EOSINOPHIL NFR BLD AUTO: 3.2 %
ESTIMATED AVERAGE GLUCOSE: 126 MG/DL
GLUCOSE QUALITATIVE U: NEGATIVE
GLUCOSE SERPL-MCNC: 101 MG/DL
HBA1C MFR BLD HPLC: 6 %
HCT VFR BLD CALC: 39.3 %
HDLC SERPL-MCNC: 70 MG/DL
HGB BLD-MCNC: 13.4 G/DL
HYALINE CASTS: 0 /LPF
IMM GRANULOCYTES NFR BLD AUTO: 0.4 %
KETONES URINE: NEGATIVE
LDLC SERPL CALC-MCNC: 148 MG/DL
LEUKOCYTE ESTERASE URINE: ABNORMAL
LYMPHOCYTES # BLD AUTO: 1.52 K/UL
LYMPHOCYTES NFR BLD AUTO: 21.1 %
MAN DIFF?: NORMAL
MCHC RBC-ENTMCNC: 32 PG
MCHC RBC-ENTMCNC: 34.1 GM/DL
MCV RBC AUTO: 93.8 FL
MICROSCOPIC-UA: NORMAL
MONOCYTES # BLD AUTO: 0.7 K/UL
MONOCYTES NFR BLD AUTO: 9.7 %
NEUTROPHILS # BLD AUTO: 4.68 K/UL
NEUTROPHILS NFR BLD AUTO: 64.8 %
NITRITE URINE: NEGATIVE
NONHDLC SERPL-MCNC: 167 MG/DL
PH URINE: 7
PLATELET # BLD AUTO: 248 K/UL
POTASSIUM SERPL-SCNC: 4.1 MMOL/L
PROT SERPL-MCNC: 6.4 G/DL
PROTEIN URINE: NORMAL
RBC # BLD: 4.19 M/UL
RBC # FLD: 14.3 %
RED BLOOD CELLS URINE: 3 /HPF
SODIUM SERPL-SCNC: 141 MMOL/L
SPECIFIC GRAVITY URINE: 1.02
SQUAMOUS EPITHELIAL CELLS: 8 /HPF
T4 FREE SERPL-MCNC: 1.1 NG/DL
TRIGL SERPL-MCNC: 95 MG/DL
TSH SERPL-ACNC: 2.07 UIU/ML
URATE SERPL-MCNC: 6 MG/DL
UROBILINOGEN URINE: NORMAL
VIT B12 SERPL-MCNC: 548 PG/ML
WBC # FLD AUTO: 7.22 K/UL
WHITE BLOOD CELLS URINE: 12 /HPF

## 2022-09-19 PROCEDURE — 99442: CPT | Mod: 95

## 2022-09-19 RX ORDER — FLUTICASONE PROPIONATE 50 UG/1
50 SPRAY, METERED NASAL TWICE DAILY
Qty: 1 | Refills: 2 | Status: ACTIVE | COMMUNITY
Start: 2021-01-12 | End: 1900-01-01

## 2022-09-22 ENCOUNTER — RX RENEWAL (OUTPATIENT)
Age: 87
End: 2022-09-22

## 2022-10-28 ENCOUNTER — RX RENEWAL (OUTPATIENT)
Age: 87
End: 2022-10-28

## 2022-11-02 ENCOUNTER — APPOINTMENT (OUTPATIENT)
Dept: INTERNAL MEDICINE | Facility: CLINIC | Age: 87
End: 2022-11-02
Payer: MEDICARE

## 2022-11-02 VITALS
OXYGEN SATURATION: 97 % | HEART RATE: 89 BPM | HEIGHT: 62 IN | BODY MASS INDEX: 43.24 KG/M2 | DIASTOLIC BLOOD PRESSURE: 80 MMHG | SYSTOLIC BLOOD PRESSURE: 132 MMHG | WEIGHT: 235 LBS | TEMPERATURE: 97.2 F

## 2022-11-02 DIAGNOSIS — Z23 ENCOUNTER FOR IMMUNIZATION: ICD-10-CM

## 2022-11-02 PROCEDURE — 99214 OFFICE O/P EST MOD 30 MIN: CPT | Mod: 25

## 2022-11-02 PROCEDURE — 36415 COLL VENOUS BLD VENIPUNCTURE: CPT

## 2022-11-02 PROCEDURE — G0008: CPT

## 2022-11-02 PROCEDURE — 90662 IIV NO PRSV INCREASED AG IM: CPT

## 2022-11-02 RX ORDER — VITAMIN E (DL,TOCOPHERYL ACET) 180 MG
CAPSULE ORAL DAILY
Refills: 0 | Status: ACTIVE | COMMUNITY

## 2022-11-02 RX ORDER — MULTIVIT-MIN/FA/LYCOPEN/LUTEIN .4-300-25
TABLET ORAL DAILY
Qty: 90 | Refills: 3 | Status: ACTIVE | COMMUNITY

## 2022-11-02 RX ORDER — NAPROXEN 500 MG/1
500 TABLET ORAL
Qty: 20 | Refills: 0 | Status: COMPLETED | COMMUNITY
Start: 2022-08-21

## 2022-11-02 RX ORDER — PENICILLIN V POTASSIUM 500 MG/1
500 TABLET, FILM COATED ORAL
Qty: 28 | Refills: 0 | Status: COMPLETED | COMMUNITY
Start: 2022-08-21

## 2022-11-02 RX ORDER — AMOXICILLIN 500 MG/1
500 TABLET, FILM COATED ORAL
Qty: 30 | Refills: 0 | Status: COMPLETED | COMMUNITY
Start: 2022-08-31

## 2022-12-08 ENCOUNTER — APPOINTMENT (OUTPATIENT)
Dept: MRI IMAGING | Facility: CLINIC | Age: 87
End: 2022-12-08

## 2022-12-08 ENCOUNTER — OUTPATIENT (OUTPATIENT)
Dept: OUTPATIENT SERVICES | Facility: HOSPITAL | Age: 87
LOS: 1 days | End: 2022-12-08
Payer: MEDICARE

## 2022-12-08 DIAGNOSIS — Z00.8 ENCOUNTER FOR OTHER GENERAL EXAMINATION: ICD-10-CM

## 2022-12-08 DIAGNOSIS — H26.9 UNSPECIFIED CATARACT: Chronic | ICD-10-CM

## 2022-12-08 DIAGNOSIS — Z96.659 PRESENCE OF UNSPECIFIED ARTIFICIAL KNEE JOINT: Chronic | ICD-10-CM

## 2022-12-08 DIAGNOSIS — G56.00 CARPAL TUNNEL SYNDROME, UNSPECIFIED UPPER LIMB: Chronic | ICD-10-CM

## 2022-12-08 DIAGNOSIS — Z96.60 PRESENCE OF UNSPECIFIED ORTHOPEDIC JOINT IMPLANT: Chronic | ICD-10-CM

## 2022-12-08 PROCEDURE — 74181 MRI ABDOMEN W/O CONTRAST: CPT | Mod: 26,MH

## 2022-12-08 PROCEDURE — 74181 MRI ABDOMEN W/O CONTRAST: CPT | Mod: MH

## 2022-12-19 ENCOUNTER — RX RENEWAL (OUTPATIENT)
Age: 87
End: 2022-12-19

## 2023-01-04 ENCOUNTER — RX RENEWAL (OUTPATIENT)
Age: 88
End: 2023-01-04

## 2023-01-04 LAB
ALBUMIN SERPL ELPH-MCNC: 3.9 G/DL
ALP BLD-CCNC: 109 U/L
ALT SERPL-CCNC: 32 U/L
ANION GAP SERPL CALC-SCNC: 16 MMOL/L
AST SERPL-CCNC: 36 U/L
BILIRUB SERPL-MCNC: 0.2 MG/DL
BUN SERPL-MCNC: 19 MG/DL
CALCIUM SERPL-MCNC: 9.9 MG/DL
CHLORIDE SERPL-SCNC: 101 MMOL/L
CO2 SERPL-SCNC: 22 MMOL/L
CREAT SERPL-MCNC: 0.82 MG/DL
EGFR: 70 ML/MIN/1.73M2
GLUCOSE SERPL-MCNC: 102 MG/DL
POTASSIUM SERPL-SCNC: 4 MMOL/L
PROT SERPL-MCNC: 6.5 G/DL
SODIUM SERPL-SCNC: 139 MMOL/L
URATE SERPL-MCNC: 5.7 MG/DL

## 2023-01-10 ENCOUNTER — RX RENEWAL (OUTPATIENT)
Age: 88
End: 2023-01-10

## 2023-02-23 NOTE — ASSESSMENT
[FreeTextEntry1] : Patient's BP stable, and she can continue the current regimen.  Patient may have had a viral syndrome recently, but it resolved.\par \par Patient to continue PT for gait and balance.\par \par Labs drawn in office as below.  Include uric acid.\par \par Influenza vaccination given today.

## 2023-02-23 NOTE — HISTORY OF PRESENT ILLNESS
[Other: _____] : [unfilled] [de-identified] : Patient for follow-up of hypertension.  Patient stable on her current regimen of spironolactone 25mg daily and she takes furosemide 20mg weekly.  She has no chest pain, dyspnea, dizziness, lightheadedness.  She had a cough over recent weeks, and heard a noise in the chest.  No fevers or chills.  She took Benadryl, Mucinex, and Flonase.  The symptoms have since resolved.\par \par Patient notes that she is also on a Centrum, a Calcium Citrate with Mg/Zinc/D.  She stopped the vitamin D pill.\par \par She has ongoing trouble walking - getting PT weekly.  She is walking with 2 canes.  She uses 4 Tylenol tablets/day.  She sees Dr. Mendieta, and will go to Neuro as well (Dr. Melendez).\par \par Patient reports receiving the 4th Moderna COVID-19 vaccine on 7/20/2022.  She will take the influenza vaccination today.

## 2023-03-20 ENCOUNTER — NON-APPOINTMENT (OUTPATIENT)
Age: 88
End: 2023-03-20

## 2023-03-20 ENCOUNTER — APPOINTMENT (OUTPATIENT)
Dept: CARDIOLOGY | Facility: CLINIC | Age: 88
End: 2023-03-20
Payer: MEDICARE

## 2023-03-20 VITALS
OXYGEN SATURATION: 96 % | SYSTOLIC BLOOD PRESSURE: 156 MMHG | HEART RATE: 89 BPM | DIASTOLIC BLOOD PRESSURE: 79 MMHG | WEIGHT: 240 LBS | BODY MASS INDEX: 43.9 KG/M2

## 2023-03-20 VITALS — SYSTOLIC BLOOD PRESSURE: 136 MMHG | DIASTOLIC BLOOD PRESSURE: 76 MMHG | HEART RATE: 80 BPM

## 2023-03-20 DIAGNOSIS — I89.0 LYMPHEDEMA, NOT ELSEWHERE CLASSIFIED: ICD-10-CM

## 2023-03-20 DIAGNOSIS — M10.9 GOUT, UNSPECIFIED: ICD-10-CM

## 2023-03-20 DIAGNOSIS — E66.9 LYMPHEDEMA, NOT ELSEWHERE CLASSIFIED: ICD-10-CM

## 2023-03-20 PROCEDURE — 93000 ELECTROCARDIOGRAM COMPLETE: CPT

## 2023-03-20 PROCEDURE — 99215 OFFICE O/P EST HI 40 MIN: CPT

## 2023-03-20 PROCEDURE — 36415 COLL VENOUS BLD VENIPUNCTURE: CPT

## 2023-03-20 NOTE — PHYSICAL EXAM
[General Appearance - Well Developed] : well developed [Normal Appearance] : normal appearance [Well Groomed] : well groomed [General Appearance - Well Nourished] : well nourished [No Deformities] : no deformities [General Appearance - In No Acute Distress] : no acute distress [Normal Conjunctiva] : the conjunctiva exhibited no abnormalities [Eyelids - No Xanthelasma] : the eyelids demonstrated no xanthelasmas [Normal Oral Mucosa] : normal oral mucosa [No Oral Pallor] : no oral pallor [No Oral Cyanosis] : no oral cyanosis [Normal Jugular Venous A Waves Present] : normal jugular venous A waves present [Normal Jugular Venous V Waves Present] : normal jugular venous V waves present [No Jugular Venous Brennan A Waves] : no jugular venous brennan A waves [Respiration, Rhythm And Depth] : normal respiratory rhythm and effort [Exaggerated Use Of Accessory Muscles For Inspiration] : no accessory muscle use [Auscultation Breath Sounds / Voice Sounds] : lungs were clear to auscultation bilaterally [Heart Rate And Rhythm] : heart rate and rhythm were normal [Heart Sounds] : normal S1 and S2 [Murmurs] : no murmurs present [Abdomen Soft] : soft [Abdomen Tenderness] : non-tender [Abdomen Mass (___ Cm)] : no abdominal mass palpated [Abnormal Walk] : normal gait [Nail Clubbing] : no clubbing of the fingernails [Cyanosis, Localized] : no localized cyanosis [Petechial Hemorrhages (___cm)] : no petechial hemorrhages [Skin Color & Pigmentation] : normal skin color and pigmentation [] : no rash [No Venous Stasis] : no venous stasis [Skin Lesions] : no skin lesions [No Skin Ulcers] : no skin ulcer [No Xanthoma] : no  xanthoma was observed [Oriented To Time, Place, And Person] : oriented to person, place, and time [Affect] : the affect was normal [Mood] : the mood was normal [No Anxiety] : not feeling anxious [FreeTextEntry1] : Significant nonpitting edema bilaterally 1/3-1/2 up.  No skin breakdown.  Pedal pulses 2+

## 2023-03-20 NOTE — HISTORY OF PRESENT ILLNESS
[FreeTextEntry1] : I have been seeing the patient since 2007. At that time she had some dyspnea on exertion but adenosine thallium done preop to hip surgery was felt to be negative. However in 2009 she had increase in symptoms. At that time a stress echo was abnormal in stage II with anterior ischemia. She had responded well to medical therapy and had been stable since then. She did have a syncopal episode in April of 2011 but that seemed to be related to volume depletion and/or vasopressor. That has not recurred. She has had trouble with any statins including Zocor, Crestor,  Pravachol, and Lescol.\par January 2013  Patient returns for followup. She still has class II symptoms with shortness of breath when she walks uphill, although she claimed that if she uses her cane she doesn't get it. She can walk 2.5 miles per hour for 25 minutes on level ground without symptoms. She does not have chest pain. She is able to get around and do her shopping but mostly with the car. Her pedal edema seems to be worse lately. Her orthopedic symptoms continued to progress, and she is considering Knee replacement in June. She is having some change in her vision and is wondering if any of her medications could be causing it.  She underwent knee replacement at the [facility] in July without incident. She started seeing her new internist Dr. Gonzalo Haley on August 28, 2013 and had a lipid profile then with cholesterol 184, triglycerides 106, HDL 68, LDL 95.\par October 7, 2013. Patient returns for followup. 3 weeks ago, she stopped her Livalo because of her proximal muscle weakness with trouble getting up out of a chair. She has not seen much improvement yet which wants to wait a few more weeks before going to a neurologist. She has been getting her usual shortness of breath if she walks level ground now without a cane and even some tightness in her throat that is relieved with rest. However if she uses her cane she has no symptoms. Just today she has swelling and pain in the new knee; she had a wedding last night with a long car ride but cannot recall any trauma. \par January 20, 2014. The patient is here in followup. She has been off Livalo for a while now and her symptoms did resolve. She can step up on a curb and get up off a chair without a problem. However she now notices that if she walks even one block she gets short of breath as well as tightening in her throat which is relieved with rest. If she uses her cane or if she walks on the treadmill at 2.5 miles an hour level she does not get the symptoms. She returned for a stress echocardiogram that was abnormal with shortness of breath, ST depressions, and an anterior wall motion abnormality at 7 METs, . I increased her bisoprolol to 15 mg so she could go to [place] and this did improve her symptoms. She returned from [place] and had cardiac catheterization on February 25 which showed totally normal coronary arteries without any evidence of plaquing. In addition she claims they did some kind of Doppler on her legs (? WEST) which was normal as well. I have no record of that.\par March 10, 2014. The patient returns for followup. She has been feeling fine without significant symptoms even though she cut her bisoprolol back to 10 mg. She would like to come down on her medications as she thinks it may have an effect on her vision getting worse over the last year or so. 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 We agreed to stop Ranexa and she went down to 10 mg on the bisoprolol. She is staying off statins.\par May, 12 2014. The patient is here for followup. Her dyspnea on exertion has gotten much worse even on the walk here from the parking lot. She denies chest pressure or tightness. He is also having some trouble with her balance. She has a dry chronic cough as well not necessarily positional. She has never had a pulmonary workup yet and she is off the Ranexa since her last visit as well as on the lower dose of beta blocker. I again explained to her the issue of syndrome X and having angina with normal coronaries. She has also been unable to tolerate any statins. A VAP profile was sent which showed her to have pattern A LDL and the only high risk findings was an elevated Lp(a). She was referred to pulmonary whom she saw on July 14. He felt her dyspnea could be multifactorial including overweight, orthopedic, and chronotropic incompetence. There was no primary lung disease or airway disease. We tried Ranexa 1000 mg b.i.d. and she clearly had a good response with less shortness of breath initially.\par June 22, 2015. The patient is here in follow up for the first time in over a year. She had been complaining that her shortness of breath was worse now happening even on level ground and not just uphill. However since then she has not done much walking as she has severe sciatica on the right and she has some type of left flank pain that radiated to her abdomen that is being worked up by Dr. Gonzalo Haley. She had CAT scans last week but the results are still not available. She does have an upcoming appointment with a neurologist. She does not do well with NSAIDs as they give her headaches, so she takes Tylenol around the clock 3 times a day. Her latest lipid profile last month with Dr. Haley was similar to previously with a good cholesterol to HDL ratio and a high LDL. She has tried virtually all the statins as well as Zetia and has not tolerated any of them. She did have normal coronaries on cardiac catheterization in February of 2014.\par December 8, 2015. Patient here in followup as her medicines would not be renewed. Most of her problems seem to the orthopedic. She had some issues with UTI and had an endoscopy that showed nothing other than pancreatic cysts that were found on workup. She does have dyspnea on exertion still. It is not clear if the Ranexa and the beta blocker are helping her and giving her side effects. She just started back on the treadmill at 2.5 mi./h and did at least a mile this morning. Her cholesterol is high on her last blood test but she is on no cholesterol medications as she does not tolerate them and on coronary angiography had  totally clear coronary arteries. \par May 16, 2016. The patient is here for followup. Her main problem has been her back and she may need surgery. She has multiple levels of disc problems. She has significant pain and can only do a little bit of walking and a little bit on the bike. She had 3 injections from Dr. Alvarenga that took a while to kick in, but finally did help. She is scheduled for an EMG. She is also concerned about some cysts on her pancreas that are being followed up. No angina. No shortness of breath with exertion, but she is less active than she used to be. Exam and EKG are unremarkable from a cardiac point of view.\par \par November 2016. The patient is scheduled for a right total hip replacement. She had a medical clearance per Dr. Gonzalo Haley, her internist. I did not feel that she needed a cardiac clearance as well but after reviewing her records, her orthopedist wanted a cardiac evaluation and clearance as well. Briefly, the patient has had a history of dyspnea on exertion that sounded like an anginal equivalent. She had two abnormal stress echoes and after the second she underwent coronary angiography, which showed normal coronary arteries without plaque or obstruction. She either has syndrome X which is angina with normal coronary arteries, or her dyspnea on exertion has nothing to do with her heart and the stress echoes are false Positive. In either case, the prognosis is very good and she should have no contraindication to the upcoming surgery. She has high cholesterol, but has not tolerated any statins, and subsequent lipid panel particle breakdown was noted to have large buoyant LDL, type A, which places her in a low risk group, despite the elevated LDL number. I reviewed her EKG from Dr. Haley and from presurgical testing both of which are normal. I reviewed her labs, which are acceptable. I do not find that she is at cardiac risk for the upcoming surgery and anesthesia.\par May 3, 2017. First visit back since her hip surgery. Fairly stable. Still having trouble walking, which could be related to the other hip or some other orthopedic issue and she will be seeing both her hip surgeon and her general orthopedist, relatively soon. Occasional shortness of breath with exertion, but no chest pain. Nocturnal cough, which could be reflux, but she didn't think so and did not want to try PPI. No intrerval hospitalizations and no change in her medication. She saw Dr. Gonzalo Haley in January and had a venous Doppler of the leg that was negative.\par September 13, 2017. The patient here in followup along with her . No change. Still gets short of breath with exertion. Still doesn't walk great. Not taking statins as not tolerated, but also normal coronary arteries as mentioned.\par March 12, 2018. Patient here in followup. 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 Was lethargic and somewhat sick during much of the winter that probably was influenza along with her . Still has her chronic dyspnea on exertion and some chest pain, which she assumes is weight and muscular, but could be her angina with normal coronaries. \par October 8, 2018. The patient here in followup together with her . Remains in sinus rhythm with left anterior hemiblock and poor R wave progression. Trouble with left knee, old replacement (23 years).\par \par June 30, 2021.On June 28 the patient went to her ophthalmologist Dr. Morelos after her fourth episode of amaurosis fugax over the last few weeks.  He insisted on her going to the emergency room and rule out a stroke and TIA etc.  She remained overnight at [facility].  She had a CAT scan and MRI and a CT angio as well as monitoring.  Dr. Shepherd of neurology reviewed the studies and spoke with me today.  Absolutely no evidence of atherosclerosis.  No evidence of stroke.  No evidence of atrial fibrillation.  Unlikely that this could be caused by atrial fibrillation where you would have four embolic episodes all going to the same exact place and causing reversible changes etc.  Therefore he believes she needs a fluorescein angiogram and to see a retina specialist and that the stroke and embolic work-up has been negative.  In the meantime she remains on Plavix because aspirin upsets her stomach.  She refused to take the statin that she was prescribed at [facility] because she did not tolerate any statins that we have tried over the years and again it does not look like there is any atherosclerosis involved; similarly when we did coronary angiography on her a few years ago she had clean coronaries as well.  I reviewed all of this with the patient's daughter and they will schedule an appointment with the retina specialist. \par July 6, 2021.  First visit in almost 3 years.  She had a brief hospitalization after amaurosis fugax (see chart note).\par She brought records from [facility] dated June 28, 2021.  Normal CBC.  BUN 27 creatinine 1.0 potassium 3.5.  Normal LFTs.  Hemoglobin A1c 5.9.  Negative troponin.  Cholesterol 257, triglycerides 173, HDL 71, .  CT head without contrast negative.  MRI June 29 with prominent cerebral volume loss but no evidence of prior ischemic event.  Otherwise normal.  MRI of brain and orbits with normal pituitary gland.  Ventricles not enlarged.  No evidence of CVA.  No significant small vessel white matter disease present.  At least moderate cerebral and cerebellar volume loss.  Cerebellum and brainstem otherwise intact.  Cerebellopontine angles and IACs are unremarkable.  Bilateral cataract surgery noted but no evidence of intracranial or orbital mass.  Retro-orbital adipose tissue normal and no significant sinus disease.  Chest x-ray unremarkable except some blunting of left costophrenic sulcus.  CTA with no large vessel occlusion.  No aneurysm.  No vasculitis.  Vascularity of the orbits within normal limits.  No internal carotid artery stenosis or occlusion.  Intact carotid and vertebral artery systems.  Advanced degenerative disc disease.  Patient discharged on clopidogrel because she does not tolerate aspirin.  She was recommended statins but refused because she did not tolerate them in the past and in fact given the absence of any atherosclerosis it is reasonable to stop.  She was advised by the neurologist and by me to see a retina specialist.  EKG here is sinus rhythm at 82 and totally within normal limits with a left anterior hemiblock. \par December 7, 2021.Patient called because itching all over a lot.  May be chronic although worse now and she thinks possibly it started with the clopidogrel but she is not sure.  It is the newest medicine she is on although she has been on it for a while now.  As it is it is not clear that she has indication for clopidogrel as neurology work-up did not seem to indicate her episodes were TIAs so it is okay to stop it for now and see if the itching resolves.  If not she could probably go back on it. \par (I spoke with Dr. Holt and she continues to occasionally have some minor episodes on the right side when she talks on her cell phone but again nothing that sounds vascular or embolic.  Her itching finally resolved with multiple treatments, probably had nothing to do with the clopidogrel but currently she is on no antiplatelet agents.)\par January 25, 2022.  Returns here with her daughter.  She had developed gout so Dr. Haley treated her, she is still on colchicine now once a day, and he stopped the other diuretics and gave her just furosemide 20 daily.  Meanwhile she has severe bilateral pedal edema but when she wakes up in the morning after lying flat in bed all night the swelling is way down.  However she finds it too uncomfortable to keep her legs elevated during the day.  Exceedingly long discussion with her and her daughter about seeing vascular for work-up of venous insufficiency, whether she is a candidate for laser therapy, however she just needs lymphedema massage therapy followed by vascular stockings.  By the end I considered adding spironolactone 25 to her furosemide 20 while stopping the potassium, and referring her to Dr. Mcallister of vascular.\par January 26, 2022. Reviewed labs with patient. Normal BNP. Normal renal function. Potassium 3.9. HDL 69 with . We will add spironolactone, stop potassium, continue to work on low saturated fat diet although patient with no evidence of atherosclerosis. Patient made appointment with vascular. \par October 28, 2022.Patient called about renewing her spironolactone.  It does seem to be helping her.  Labs from August were okay.  She is only taking the furosemide sparingly.  Okay to renew and I would like to see her in January which would be 1 year. \par March 20, 2023.  First visit in 14 months.  Back in March of last year she did see vascular.  Venous Dopplers showed no evidence of DVT or SVT insufficiency in the left GSV only.  No arterial disease.  Recommended compression stockings and elevation in addition to weight loss.  They did discuss lymphedema therapy with patient prefer to go with the compression stockings first.  Now patient returns here.  EKG is sinus rhythm at 83 and a normal tracing other than a left anterior hemiblock.  Her initial blood pressure was elevated but was normal elsewhere in November.  Her weight is up 23 pounds from last year; she complains that she does not have that many activities to do her so having a cup of tea with cake etc. is one of her daily activities.  Still having issues with edema; she cannot put the stockings on herself and the aide only comes 4 days a week so those days she has been stockings.  Long discussion about lymphedema massage therapy as I think she would be a good candidate.  We also talked about dietitian referral.  She has a nightly cough; she gets up at night to go to the bathroom because of the furosemide and when she comes back she has something to eat and then lies down so this may be acid reflux and she will discuss this with Dr. Haley.  No interval syncope or cardiac issues or anything with COVID etc.

## 2023-03-20 NOTE — REVIEW OF SYSTEMS
[Dyspnea on exertion] : dyspnea during exertion [Lower Ext Edema] : lower extremity edema [Joint Pain] : joint pain [Joint Stiffness] : joint stiffness [Negative] : Heme/Lymph [Weight Gain (___ Lbs)] : [unfilled] ~Ulb weight gain [Weight Loss (___ Lbs)] : no recent weight loss [Chest Discomfort] : no chest discomfort [Leg Claudication] : no intermittent leg claudication [Syncope] : no syncope [FreeTextEntry2] : Actually down 8 pounds. [FreeTextEntry3] : see HPI [FreeTextEntry9] : severe DJD on MRI [de-identified] : see HPI.,  Chronic trouble with her balance

## 2023-03-20 NOTE — REASON FOR VISIT
[FreeTextEntry1] : 88-year-old woman with angina and normal coronary arteries is here in followup with main issue now severe pedal edema

## 2023-03-21 ENCOUNTER — RX RENEWAL (OUTPATIENT)
Age: 88
End: 2023-03-21

## 2023-03-21 LAB
ALBUMIN SERPL ELPH-MCNC: 4.1 G/DL
ALP BLD-CCNC: 105 U/L
ALT SERPL-CCNC: 25 U/L
ANION GAP SERPL CALC-SCNC: 11 MMOL/L
AST SERPL-CCNC: 25 U/L
BASOPHILS # BLD AUTO: 0.03 K/UL
BASOPHILS NFR BLD AUTO: 0.5 %
BILIRUB SERPL-MCNC: 0.8 MG/DL
BUN SERPL-MCNC: 23 MG/DL
CALCIUM SERPL-MCNC: 9.8 MG/DL
CHLORIDE SERPL-SCNC: 109 MMOL/L
CHOLEST SERPL-MCNC: 169 MG/DL
CO2 SERPL-SCNC: 23 MMOL/L
CREAT SERPL-MCNC: 1 MG/DL
EGFR: 55 ML/MIN/1.73M2
EOSINOPHIL # BLD AUTO: 0.16 K/UL
EOSINOPHIL NFR BLD AUTO: 2.6 %
ESTIMATED AVERAGE GLUCOSE: 120 MG/DL
GLUCOSE SERPL-MCNC: 91 MG/DL
HBA1C MFR BLD HPLC: 5.8 %
HCT VFR BLD CALC: 38.8 %
HDLC SERPL-MCNC: 50 MG/DL
HGB BLD-MCNC: 12.3 G/DL
IMM GRANULOCYTES NFR BLD AUTO: 0.2 %
LDLC SERPL CALC-MCNC: 104 MG/DL
LYMPHOCYTES # BLD AUTO: 1.72 K/UL
LYMPHOCYTES NFR BLD AUTO: 28.1 %
MAN DIFF?: NORMAL
MCHC RBC-ENTMCNC: 30.1 PG
MCHC RBC-ENTMCNC: 31.7 GM/DL
MCV RBC AUTO: 94.9 FL
MONOCYTES # BLD AUTO: 0.48 K/UL
MONOCYTES NFR BLD AUTO: 7.8 %
NEUTROPHILS # BLD AUTO: 3.73 K/UL
NEUTROPHILS NFR BLD AUTO: 60.8 %
NONHDLC SERPL-MCNC: 119 MG/DL
NT-PROBNP SERPL-MCNC: 646 PG/ML
PLATELET # BLD AUTO: 182 K/UL
POTASSIUM SERPL-SCNC: 4.4 MMOL/L
PROT SERPL-MCNC: 6.2 G/DL
RBC # BLD: 4.09 M/UL
RBC # FLD: 13.3 %
SODIUM SERPL-SCNC: 143 MMOL/L
TRIGL SERPL-MCNC: 77 MG/DL
TSH SERPL-ACNC: 2.48 UIU/ML
URATE SERPL-MCNC: 5.4 MG/DL
WBC # FLD AUTO: 6.13 K/UL

## 2023-03-27 ENCOUNTER — RX RENEWAL (OUTPATIENT)
Age: 88
End: 2023-03-27

## 2023-03-29 ENCOUNTER — APPOINTMENT (OUTPATIENT)
Dept: INTERNAL MEDICINE | Facility: CLINIC | Age: 88
End: 2023-03-29
Payer: MEDICARE

## 2023-03-29 VITALS
WEIGHT: 237 LBS | HEART RATE: 78 BPM | DIASTOLIC BLOOD PRESSURE: 82 MMHG | OXYGEN SATURATION: 98 % | SYSTOLIC BLOOD PRESSURE: 144 MMHG | HEIGHT: 62 IN | BODY MASS INDEX: 43.61 KG/M2 | TEMPERATURE: 97.6 F

## 2023-03-29 DIAGNOSIS — G89.29 LUMBAGO WITH SCIATICA, UNSPECIFIED SIDE: ICD-10-CM

## 2023-03-29 DIAGNOSIS — M54.40 LUMBAGO WITH SCIATICA, UNSPECIFIED SIDE: ICD-10-CM

## 2023-03-29 DIAGNOSIS — R26.89 OTHER ABNORMALITIES OF GAIT AND MOBILITY: ICD-10-CM

## 2023-03-29 PROCEDURE — 99213 OFFICE O/P EST LOW 20 MIN: CPT | Mod: 25

## 2023-03-30 RX ORDER — TRIAMCINOLONE ACETONIDE 5 MG/G
0.5 CREAM TOPICAL 3 TIMES DAILY
Qty: 60 | Refills: 0 | Status: ACTIVE | COMMUNITY
Start: 2023-03-30 | End: 1900-01-01

## 2023-04-10 ENCOUNTER — APPOINTMENT (OUTPATIENT)
Dept: CARDIOLOGY | Facility: CLINIC | Age: 88
End: 2023-04-10
Payer: MEDICARE

## 2023-04-10 PROCEDURE — 93306 TTE W/DOPPLER COMPLETE: CPT

## 2023-04-17 ENCOUNTER — NON-APPOINTMENT (OUTPATIENT)
Age: 88
End: 2023-04-17

## 2023-04-23 LAB — NT-PROBNP SERPL-MCNC: 60 PG/ML

## 2023-04-25 ENCOUNTER — APPOINTMENT (OUTPATIENT)
Dept: INTERNAL MEDICINE | Facility: CLINIC | Age: 88
End: 2023-04-25
Payer: MEDICARE

## 2023-04-25 PROCEDURE — 99442: CPT | Mod: 95

## 2023-05-04 ENCOUNTER — NON-APPOINTMENT (OUTPATIENT)
Age: 88
End: 2023-05-04

## 2023-05-11 ENCOUNTER — RX RENEWAL (OUTPATIENT)
Age: 88
End: 2023-05-11

## 2023-05-22 ENCOUNTER — RX RENEWAL (OUTPATIENT)
Age: 88
End: 2023-05-22

## 2023-05-25 PROBLEM — R26.89 IMBALANCE: Status: ACTIVE | Noted: 2017-06-28

## 2023-05-25 NOTE — PHYSICAL EXAM
[No Edema] : there was no peripheral edema [Normal] : no joint swelling and grossly normal strength and tone [de-identified] : Negative straight leg raise.

## 2023-05-25 NOTE — HISTORY OF PRESENT ILLNESS
[de-identified] : Patient has ongoing imbalance, and pain in the right leg, radiating down the leg.  She has no weakness, numbness, or tingling.  She has no bowel or bladder incontinence.  She has not had any recent falls.\par \par

## 2023-05-25 NOTE — ASSESSMENT
[FreeTextEntry1] : Patient with imbalance, and right sided sciatica.  Patient given a referral to PT.  She can use Tylenol and Lidocaine 4% patch.  She should minimize use of NSAIDs as they can raise the BP.

## 2023-06-20 ENCOUNTER — APPOINTMENT (OUTPATIENT)
Dept: INTERNAL MEDICINE | Facility: CLINIC | Age: 88
End: 2023-06-20
Payer: MEDICARE

## 2023-06-20 VITALS
HEIGHT: 60 IN | OXYGEN SATURATION: 96 % | WEIGHT: 238 LBS | TEMPERATURE: 97.6 F | SYSTOLIC BLOOD PRESSURE: 138 MMHG | BODY MASS INDEX: 46.72 KG/M2 | DIASTOLIC BLOOD PRESSURE: 82 MMHG | HEART RATE: 90 BPM

## 2023-06-20 PROCEDURE — 99213 OFFICE O/P EST LOW 20 MIN: CPT | Mod: 25

## 2023-06-27 ENCOUNTER — RX RENEWAL (OUTPATIENT)
Age: 88
End: 2023-06-27

## 2023-08-02 ENCOUNTER — LABORATORY RESULT (OUTPATIENT)
Age: 88
End: 2023-08-02

## 2023-08-03 ENCOUNTER — LABORATORY RESULT (OUTPATIENT)
Age: 88
End: 2023-08-03

## 2023-08-16 ENCOUNTER — APPOINTMENT (OUTPATIENT)
Dept: INTERNAL MEDICINE | Facility: CLINIC | Age: 88
End: 2023-08-16
Payer: MEDICARE

## 2023-08-16 VITALS
WEIGHT: 237 LBS | OXYGEN SATURATION: 97 % | HEART RATE: 92 BPM | HEIGHT: 60 IN | SYSTOLIC BLOOD PRESSURE: 140 MMHG | TEMPERATURE: 97 F | DIASTOLIC BLOOD PRESSURE: 70 MMHG | BODY MASS INDEX: 46.53 KG/M2

## 2023-08-16 DIAGNOSIS — K21.9 GASTRO-ESOPHAGEAL REFLUX DISEASE W/OUT ESOPHAGITIS: ICD-10-CM

## 2023-08-16 DIAGNOSIS — Z00.00 ENCOUNTER FOR GENERAL ADULT MEDICAL EXAMINATION W/OUT ABNORMAL FINDINGS: ICD-10-CM

## 2023-08-16 PROCEDURE — G0439: CPT

## 2023-08-16 RX ORDER — COLCHICINE 0.6 MG/1
0.6 TABLET ORAL
Qty: 90 | Refills: 1 | Status: COMPLETED | COMMUNITY
Start: 2021-10-11 | End: 2023-08-16

## 2023-08-16 NOTE — HEALTH RISK ASSESSMENT
[Very Good] : ~his/her~ current health as very good [Good] : ~his/her~  mood as  good [No] : In the past 12 months have you used drugs other than those required for medical reasons? No [Two or more falls in past year] : Patient reported two or more falls in the past year [PHQ-2 Negative - No further assessment needed] : PHQ-2 Negative - No further assessment needed [Patient reported mammogram was normal] : Patient reported mammogram was normal [Patient reported bone density results were abnormal] : Patient reported bone density results were abnormal [HIV test declined] : HIV test declined [Hepatitis C test declined] : Hepatitis C test declined [None] : None [Alone] : lives alone [Retired] : retired [Feels Safe at Home] : Feels safe at home [Fully functional (bathing, dressing, toileting, transferring, walking, feeding)] : Fully functional (bathing, dressing, toileting, transferring, walking, feeding) [Fully functional (using the telephone, shopping, preparing meals, housekeeping, doing laundry, using] : Fully functional and needs no help or supervision to perform IADLs (using the telephone, shopping, preparing meals, housekeeping, doing laundry, using transportation, managing medications and managing finances) [Reports changes in hearing] : Reports changes in hearing [Smoke Detector] : smoke detector [Carbon Monoxide Detector] : carbon monoxide detector [Seat Belt] :  uses seat belt [With Patient/Caregiver] : , with patient/caregiver [Reviewed updated] : Reviewed, updated [Designated Healthcare Proxy] : Designated healthcare proxy [Relationship: ___] : Relationship: [unfilled] [0] : 2) Feeling down, depressed, or hopeless: Not at all (0) [] :  [Name: ___] : Health Care Proxy's Name: [unfilled]  [de-identified] : none [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  [de-identified] : PT [de-identified] : Balanced  [de-identified] : Has shower bar.   2 falls. [[id number]] : 1 [Change in mental status noted] : No change in mental status noted [Language] : denies difficulty with language [Behavior] : denies difficulty with behavior [Handling Complex Tasks] : denies difficulty handling complex tasks [Reasoning] : denies difficulty with reasoning [Sexually Active] : not sexually active [Reports changes in vision] : Reports no changes in vision [Reports changes in dental health] : Reports no changes in dental health [Sunscreen] : does not use sunscreen [MammogramDate] : 01/16 [MammogramComments] : 1/20/2016 [BoneDensityComments] : 1/3/2019 - spine +4.6 (but DJD limits accuracy), L wrist -1.2.  Hip not done. [ColonoscopyComments] : Age >75 [de-identified] : Not driving a car currently (stopped when the pandemic started). [de-identified] : Dr. Baker ([place]  Going regularly. [de-identified] : Hearing aids ([employer] miniDaT). [de-identified] : Going regularly. [AdvancecareDate] : 7/21 [FreeTextEntry4] : Health Care Proxy from 1/17/2019 on chart.

## 2023-08-16 NOTE — ASSESSMENT
[FreeTextEntry1] : (1) HCM - discuss diet, exercise, weight maintenance.  Labs drawn in office as below.  HIV/Hepatitis C screen declined.  Patient received 4 doses of the Moderna COVID-19 vaccine.  She received the influenza vaccination last season.  Tdap UTD from 7/21/2021.  She received Prevnar-13 10/14/2015 and Pneumovax 11/19/2018.  She declines Shingrix.  Last mammogram 2016, and patient has discontinued screening as well.  Follow-up with Gyn as needed.  Colon cancer screening can be discontinued due to age.  Bone density showed osteopenia at the wrist in 2019, limited evaluation of spine due to DJD of the spine and overlapping structures, and hip was not able to be done.  Patient amenable to going for a follow-up bone density this year.  Health Care Proxy on chart.  (2) CV - patient with hypertension and hyperlipidemia.  She is on furosemide 20mg daily and spironolactone 25mg daily with adequate BP control.  Her thiazide diuretic was discontinued as she had a gout attack.  She did not tolerate statins, and is not on medication for her cholesterol.  She had several episodes of amaurosis fugax in 2021, but work-up did not show atherosclerosis.  A statin was still recommended, but she declined.  She was on Plavix, and Dr. Arora recommended discontinuing the Plavix as there was no atherosclerosis on her work-up.   Follow-up with Dr. Arora as directed.  (3) Allergic rhinitis - continue Claritin or Benadryl.  She has Flonase, although not using it at this time.  (4) DJD - patient walking with a cane, and has home PT.  She sees a pain management specialist and has received several epidurals.  Physiatry referral given for ongoing management.  (5) Venous insufficiency - leg elevation, support stockings.  (6) GI - patient with h/o IPMN (pancreatic cyst), and can follow-up with GI as directed.  She also has GERD, currently not using medication.    *send Ozempic

## 2023-08-16 NOTE — HISTORY OF PRESENT ILLNESS
[Other: _____] : [unfilled] [de-identified] : Patient is an 88 year old woman with a history of hypertension, seasonal allergies on Claritin, DJD (s/p L THR, B/L TKRs) who presents for physical. She uses a cane to ambulate.  She sees Dr Sven Anderson (pain management), and has had spinal epidurals.  A physical therapist comes to the home.  She had a gout attack in 2022, and she saw a rheumatologist (Dr. Lydia Blankenship) who recommended starting allopurinol with colchicine (July 2022).  Patient sees Dr. Arora (CV) regularly.  She was sent to vascular in Mar 2022 for LE edema, and it was felt to be venous insufficiency.  Patient declines wearing stockings.  She sees Dr. Vogel (GI) for follow-up of cystic pancreas lesions.  Her spouse passed away in May 2021, and she has been coping well.  She had a positive COVID-19 antibody in June 2020 (pre-vaccination).  Her daughter previously indicated that she was also positive and likely the source.  Patient has no chest pains or dyspnea on exertion.   Patient was seen in [place] ER after 4 episodes of amaurosis fugax in 1 month (June 2021).  Patient says her right eye blacked out a few times over several weeks.  Her ophthalmologist (Dr. Stewart) sent her to the ER.  Work-up negative, and no indication of atherosclerosis (including MRI brain.  Patient is on Plavix 75mg daily as she didn't tolerate ASA.  She declined statins as she didn't tolerate them in the past.  She was sent to a retina specialist (Dr. Pedro Holt).  For her rhinitis, patient is taking Benadryl, and not using Flonase.  She has a chronic cough, with occasional morning phlegm that is clear to yellow (no blood).  Patient received a 2nd booster of the Moderna COVID-19 vaccine 7/20/2022.  She declines Shingrix.  She received an influenza vaccination Fall 2021.  Patient says she sometimes gets a cold feeling in the toes.  She had 3 epidurals with Ena Anderson.  She was sent to Dr. Rebolledo (spine).  She does exercises in the mornings.  She has pains in the feet after standing for more than 3-4 minutes.  She takes Tylenol at times.  Patient is wondering if she has carpal tunnel recurring.  Patient is taking an OTC potassium pill.  Patient says she is not on the clopidogrel.  She is taking the full pill of furosemide.

## 2023-09-07 NOTE — ASSESSMENT
[FreeTextEntry1] : Patient with stable BP on the present regimen.  Will hold blood draw today, as she will have an annual physical soon.  Continue PT/OT for gait/balance.

## 2023-09-07 NOTE — PHYSICAL EXAM
[Normal] : normal rate, regular rhythm, normal S1 and S2 and no murmur heard [Soft] : abdomen soft [Non Tender] : non-tender [Non-distended] : non-distended [No Masses] : no abdominal mass palpated [Normal Bowel Sounds] : normal bowel sounds [de-identified] : Trace edema

## 2023-09-07 NOTE — HISTORY OF PRESENT ILLNESS
[de-identified] : Patient for general follow-up.  Patient was given Jardiance by Dr. Arora, although she decided not to take it.  She gets PT/OT from [facility] for her balance.  She gets ongoing pains in her feet after standing for long periods (>3-4 minutes).  She has had several epidurals.  For the HTN, she remains on furosemide 20mg daily and spironolactone 25mg daily.  He previously had a gout attack, and is off the thiazide.

## 2023-10-11 ENCOUNTER — APPOINTMENT (OUTPATIENT)
Dept: INTERNAL MEDICINE | Facility: CLINIC | Age: 88
End: 2023-10-11
Payer: MEDICARE

## 2023-10-11 DIAGNOSIS — T20.02XA: ICD-10-CM

## 2023-10-11 PROCEDURE — 99442: CPT | Mod: 95

## 2023-10-31 ENCOUNTER — APPOINTMENT (OUTPATIENT)
Dept: BARIATRICS/WEIGHT MGMT | Facility: CLINIC | Age: 88
End: 2023-10-31

## 2023-11-28 ENCOUNTER — APPOINTMENT (OUTPATIENT)
Dept: INTERNAL MEDICINE | Facility: CLINIC | Age: 88
End: 2023-11-28
Payer: MEDICARE

## 2023-11-28 DIAGNOSIS — R60.9 EDEMA, UNSPECIFIED: ICD-10-CM

## 2023-11-28 DIAGNOSIS — E66.01 MORBID (SEVERE) OBESITY DUE TO EXCESS CALORIES: ICD-10-CM

## 2023-11-28 PROCEDURE — 99442: CPT | Mod: 95

## 2023-11-29 ENCOUNTER — LABORATORY RESULT (OUTPATIENT)
Age: 88
End: 2023-11-29

## 2023-11-29 DIAGNOSIS — R73.03 PREDIABETES.: ICD-10-CM

## 2023-12-13 ENCOUNTER — NON-APPOINTMENT (OUTPATIENT)
Age: 88
End: 2023-12-13

## 2023-12-14 PROBLEM — T20.02XA: Status: ACTIVE | Noted: 2023-12-14

## 2024-01-01 ENCOUNTER — NON-APPOINTMENT (OUTPATIENT)
Age: 89
End: 2024-01-01

## 2024-01-03 ENCOUNTER — NON-APPOINTMENT (OUTPATIENT)
Age: 89
End: 2024-01-03

## 2024-01-30 ENCOUNTER — APPOINTMENT (OUTPATIENT)
Dept: CARDIOLOGY | Facility: CLINIC | Age: 89
End: 2024-01-30

## 2024-02-08 ENCOUNTER — APPOINTMENT (OUTPATIENT)
Dept: CARDIOLOGY | Facility: CLINIC | Age: 89
End: 2024-02-08

## 2024-02-23 ENCOUNTER — RX RENEWAL (OUTPATIENT)
Age: 89
End: 2024-02-23

## 2024-02-23 RX ORDER — ALLOPURINOL 100 MG/1
100 TABLET ORAL DAILY
Qty: 180 | Refills: 3 | Status: ACTIVE | COMMUNITY
Start: 2022-07-18 | End: 1900-01-01

## 2024-02-26 ENCOUNTER — APPOINTMENT (OUTPATIENT)
Dept: INTERNAL MEDICINE | Facility: CLINIC | Age: 89
End: 2024-02-26

## 2024-03-01 ENCOUNTER — RX RENEWAL (OUTPATIENT)
Age: 89
End: 2024-03-01

## 2024-03-19 ENCOUNTER — NON-APPOINTMENT (OUTPATIENT)
Age: 89
End: 2024-03-19

## 2024-03-19 ENCOUNTER — APPOINTMENT (OUTPATIENT)
Dept: CARDIOLOGY | Facility: CLINIC | Age: 89
End: 2024-03-19
Payer: MEDICARE

## 2024-03-19 VITALS
DIASTOLIC BLOOD PRESSURE: 80 MMHG | SYSTOLIC BLOOD PRESSURE: 153 MMHG | HEART RATE: 86 BPM | HEIGHT: 60 IN | OXYGEN SATURATION: 97 %

## 2024-03-19 VITALS — HEART RATE: 78 BPM | SYSTOLIC BLOOD PRESSURE: 130 MMHG | DIASTOLIC BLOOD PRESSURE: 74 MMHG

## 2024-03-19 DIAGNOSIS — I10 ESSENTIAL (PRIMARY) HYPERTENSION: ICD-10-CM

## 2024-03-19 DIAGNOSIS — E78.5 HYPERLIPIDEMIA, UNSPECIFIED: ICD-10-CM

## 2024-03-19 DIAGNOSIS — I87.2 VENOUS INSUFFICIENCY (CHRONIC) (PERIPHERAL): ICD-10-CM

## 2024-03-19 DIAGNOSIS — R06.89 OTHER ABNORMALITIES OF BREATHING: ICD-10-CM

## 2024-03-19 PROCEDURE — 93000 ELECTROCARDIOGRAM COMPLETE: CPT

## 2024-03-19 PROCEDURE — G2211 COMPLEX E/M VISIT ADD ON: CPT

## 2024-03-19 PROCEDURE — 99215 OFFICE O/P EST HI 40 MIN: CPT

## 2024-03-19 RX ORDER — SPIRONOLACTONE 25 MG/1
25 TABLET ORAL
Qty: 90 | Refills: 1 | Status: ACTIVE | COMMUNITY
Start: 2022-01-26 | End: 1900-01-01

## 2024-03-19 NOTE — REVIEW OF SYSTEMS
[Weight Gain (___ Lbs)] : [unfilled] ~Ulb weight gain [Dyspnea on exertion] : dyspnea during exertion [Lower Ext Edema] : lower extremity edema [Joint Pain] : joint pain [Joint Stiffness] : joint stiffness [Negative] : Heme/Lymph [Weight Loss (___ Lbs)] : no recent weight loss [Chest Discomfort] : no chest discomfort [Leg Claudication] : no intermittent leg claudication [Syncope] : no syncope [FreeTextEntry9] : severe DJD on MRI [FreeTextEntry2] : Obese. [de-identified] : see HPI.,  Chronic trouble with her balance

## 2024-03-19 NOTE — REASON FOR VISIT
[FreeTextEntry1] : 89-year-old woman with angina and normal coronary arteries is here in followup with main issue now difficulty walking from arthritis and balance issues and does not get out much

## 2024-03-19 NOTE — HISTORY OF PRESENT ILLNESS
[FreeTextEntry1] : I have been seeing the patient since 2007. At that time she had some dyspnea on exertion but adenosine thallium done preop to hip surgery was felt to be negative. However in 2009 she had increase in symptoms. At that time a stress echo was abnormal in stage II with anterior ischemia. She had responded well to medical therapy and had been stable since then. She did have a syncopal episode in April of 2011 but that seemed to be related to volume depletion and/or vasopressor. That has not recurred. She has had trouble with any statins including Zocor, Crestor,  Pravachol, and Lescol. January 2013  Patient returns for followup. She still has class II symptoms with shortness of breath when she walks uphill, although she claimed that if she uses her cane she doesn't get it. She can walk 2.5 miles per hour for 25 minutes on level ground without symptoms. She does not have chest pain. She is able to get around and do her shopping but mostly with the car. Her pedal edema seems to be worse lately. Her orthopedic symptoms continued to progress, and she is considering Knee replacement in June. She is having some change in her vision and is wondering if any of her medications could be causing it.  She underwent knee replacement at the [facility] in July without incident. She started seeing her new internist Dr. Gonzalo Haley on August 28, 2013 and had a lipid profile then with cholesterol 184, triglycerides 106, HDL 68, LDL 95. October 7, 2013. Patient returns for followup. 3 weeks ago, she stopped her Livalo because of her proximal muscle weakness with trouble getting up out of a chair. She has not seen much improvement yet which wants to wait a few more weeks before going to a neurologist. She has been getting her usual shortness of breath if she walks level ground now without a cane and even some tightness in her throat that is relieved with rest. However if she uses her cane she has no symptoms. Just today she has swelling and pain in the new knee; she had a wedding last night with a long car ride but cannot recall any trauma.  January 20, 2014. The patient is here in followup. She has been off Livalo for a while now and her symptoms did resolve. She can step up on a curb and get up off a chair without a problem. However she now notices that if she walks even one block she gets short of breath as well as tightening in her throat which is relieved with rest. If she uses her cane or if she walks on the treadmill at 2.5 miles an hour level she does not get the symptoms. She returned for a stress echocardiogram that was abnormal with shortness of breath, ST depressions, and an anterior wall motion abnormality at 7 METs, . I increased her bisoprolol to 15 mg so she could go to [place] and this did improve her symptoms. She returned from [place] and had cardiac catheterization on February 25 which showed totally normal coronary arteries without any evidence of plaquing. In addition she claims they did some kind of Doppler on her legs (? WEST) which was normal as well. I have no record of that. March 10, 2014. The patient returns for followup. She has been feeling fine without significant symptoms even though she cut her bisoprolol back to 10 mg. She would like to come down on her medications as she thinks it may have an effect on her vision getting worse over the last year or so. 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 We agreed to stop Ranexa and she went down to 10 mg on the bisoprolol. She is staying off statins. May, 12 2014. The patient is here for followup. Her dyspnea on exertion has gotten much worse even on the walk here from the parking lot. She denies chest pressure or tightness. He is also having some trouble with her balance. She has a dry chronic cough as well not necessarily positional. She has never had a pulmonary workup yet and she is off the Ranexa since her last visit as well as on the lower dose of beta blocker. I again explained to her the issue of syndrome X and having angina with normal coronaries. She has also been unable to tolerate any statins. A VAP profile was sent which showed her to have pattern A LDL and the only high risk findings was an elevated Lp(a). She was referred to pulmonary whom she saw on July 14. He felt her dyspnea could be multifactorial including overweight, orthopedic, and chronotropic incompetence. There was no primary lung disease or airway disease. We tried Ranexa 1000 mg b.i.d. and she clearly had a good response with less shortness of breath initially. June 22, 2015. The patient is here in follow up for the first time in over a year. She had been complaining that her shortness of breath was worse now happening even on level ground and not just uphill. However since then she has not done much walking as she has severe sciatica on the right and she has some type of left flank pain that radiated to her abdomen that is being worked up by Dr. Gonzalo Haley. She had CAT scans last week but the results are still not available. She does have an upcoming appointment with a neurologist. She does not do well with NSAIDs as they give her headaches, so she takes Tylenol around the clock 3 times a day. Her latest lipid profile last month with Dr. Haley was similar to previously with a good cholesterol to HDL ratio and a high LDL. She has tried virtually all the statins as well as Zetia and has not tolerated any of them. She did have normal coronaries on cardiac catheterization in February of 2014. December 8, 2015. Patient here in followup as her medicines would not be renewed. Most of her problems seem to the orthopedic. She had some issues with UTI and had an endoscopy that showed nothing other than pancreatic cysts that were found on workup. She does have dyspnea on exertion still. It is not clear if the Ranexa and the beta blocker are helping her and giving her side effects. She just started back on the treadmill at 2.5 mi./h and did at least a mile this morning. Her cholesterol is high on her last blood test but she is on no cholesterol medications as she does not tolerate them and on coronary angiography had  totally clear coronary arteries.  May 16, 2016. The patient is here for followup. Her main problem has been her back and she may need surgery. She has multiple levels of disc problems. She has significant pain and can only do a little bit of walking and a little bit on the bike. She had 3 injections from Dr. Alvarenga that took a while to kick in, but finally did help. She is scheduled for an EMG. She is also concerned about some cysts on her pancreas that are being followed up. No angina. No shortness of breath with exertion, but she is less active than she used to be. Exam and EKG are unremarkable from a cardiac point of view.  November 2016. The patient is scheduled for a right total hip replacement. She had a medical clearance per Dr. Gonzalo Haley, her internist. I did not feel that she needed a cardiac clearance as well but after reviewing her records, her orthopedist wanted a cardiac evaluation and clearance as well. Briefly, the patient has had a history of dyspnea on exertion that sounded like an anginal equivalent. She had two abnormal stress echoes and after the second she underwent coronary angiography, which showed normal coronary arteries without plaque or obstruction. She either has syndrome X which is angina with normal coronary arteries, or her dyspnea on exertion has nothing to do with her heart and the stress echoes are false Positive. In either case, the prognosis is very good and she should have no contraindication to the upcoming surgery. She has high cholesterol, but has not tolerated any statins, and subsequent lipid panel particle breakdown was noted to have large buoyant LDL, type A, which places her in a low risk group, despite the elevated LDL number. I reviewed her EKG from Dr. Haley and from presurgical testing both of which are normal. I reviewed her labs, which are acceptable. I do not find that she is at cardiac risk for the upcoming surgery and anesthesia. May 3, 2017. First visit back since her hip surgery. Fairly stable. Still having trouble walking, which could be related to the other hip or some other orthopedic issue and she will be seeing both her hip surgeon and her general orthopedist, relatively soon. Occasional shortness of breath with exertion, but no chest pain. Nocturnal cough, which could be reflux, but she didn't think so and did not want to try PPI. No intrerval hospitalizations and no change in her medication. She saw Dr. Gonzalo Haley in January and had a venous Doppler of the leg that was negative. September 13, 2017. The patient here in followup along with her . No change. Still gets short of breath with exertion. Still doesn't walk great. Not taking statins as not tolerated, but also normal coronary arteries as mentioned. March 12, 2018. Patient here in followup. 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 Was lethargic and somewhat sick during much of the winter that probably was influenza along with her . Still has her chronic dyspnea on exertion and some chest pain, which she assumes is weight and muscular, but could be her angina with normal coronaries.  October 8, 2018. The patient here in followup together with her . Remains in sinus rhythm with left anterior hemiblock and poor R wave progression. Trouble with left knee, old replacement (23 years).  June 30, 2021.On June 28 the patient went to her ophthalmologist Dr. Morelos after her fourth episode of amaurosis fugax over the last few weeks.  He insisted on her going to the emergency room and rule out a stroke and TIA etc.  She remained overnight at [facility].  She had a CAT scan and MRI and a CT angio as well as monitoring.  Dr. Shepherd of neurology reviewed the studies and spoke with me today.  Absolutely no evidence of atherosclerosis.  No evidence of stroke.  No evidence of atrial fibrillation.  Unlikely that this could be caused by atrial fibrillation where you would have four embolic episodes all going to the same exact place and causing reversible changes etc.  Therefore he believes she needs a fluorescein angiogram and to see a retina specialist and that the stroke and embolic work-up has been negative.  In the meantime she remains on Plavix because aspirin upsets her stomach.  She refused to take the statin that she was prescribed at [facility] because she did not tolerate any statins that we have tried over the years and again it does not look like there is any atherosclerosis involved; similarly when we did coronary angiography on her a few years ago she had clean coronaries as well.  I reviewed all of this with the patient's daughter and they will schedule an appointment with the retina specialist.  July 6, 2021.  First visit in almost 3 years.  She had a brief hospitalization after amaurosis fugax (see chart note). She brought records from [facility] dated June 28, 2021.  Normal CBC.  BUN 27 creatinine 1.0 potassium 3.5.  Normal LFTs.  Hemoglobin A1c 5.9.  Negative troponin.  Cholesterol 257, triglycerides 173, HDL 71, .  CT head without contrast negative.  MRI June 29 with prominent cerebral volume loss but no evidence of prior ischemic event.  Otherwise normal.  MRI of brain and orbits with normal pituitary gland.  Ventricles not enlarged.  No evidence of CVA.  No significant small vessel white matter disease present.  At least moderate cerebral and cerebellar volume loss.  Cerebellum and brainstem otherwise intact.  Cerebellopontine angles and IACs are unremarkable.  Bilateral cataract surgery noted but no evidence of intracranial or orbital mass.  Retro-orbital adipose tissue normal and no significant sinus disease.  Chest x-ray unremarkable except some blunting of left costophrenic sulcus.  CTA with no large vessel occlusion.  No aneurysm.  No vasculitis.  Vascularity of the orbits within normal limits.  No internal carotid artery stenosis or occlusion.  Intact carotid and vertebral artery systems.  Advanced degenerative disc disease.  Patient discharged on clopidogrel because she does not tolerate aspirin.  She was recommended statins but refused because she did not tolerate them in the past and in fact given the absence of any atherosclerosis it is reasonable to stop.  She was advised by the neurologist and by me to see a retina specialist.  EKG here is sinus rhythm at 82 and totally within normal limits with a left anterior hemiblock.  December 7, 2021.Patient called because itching all over a lot.  May be chronic although worse now and she thinks possibly it started with the clopidogrel but she is not sure.  It is the newest medicine she is on although she has been on it for a while now.  As it is it is not clear that she has indication for clopidogrel as neurology work-up did not seem to indicate her episodes were TIAs so it is okay to stop it for now and see if the itching resolves.  If not she could probably go back on it.  (I spoke with Dr. Holt and she continues to occasionally have some minor episodes on the right side when she talks on her cell phone but again nothing that sounds vascular or embolic.  Her itching finally resolved with multiple treatments, probably had nothing to do with the clopidogrel but currently she is on no antiplatelet agents.) January 25, 2022.  Returns here with her daughter.  She had developed gout so Dr. Haley treated her, she is still on colchicine now once a day, and he stopped the other diuretics and gave her just furosemide 20 daily.  Meanwhile she has severe bilateral pedal edema but when she wakes up in the morning after lying flat in bed all night the swelling is way down.  However she finds it too uncomfortable to keep her legs elevated during the day.  Exceedingly long discussion with her and her daughter about seeing vascular for work-up of venous insufficiency, whether she is a candidate for laser therapy, however she just needs lymphedema massage therapy followed by vascular stockings.  By the end I considered adding spironolactone 25 to her furosemide 20 while stopping the potassium, and referring her to Dr. Mcallister of vascular. January 26, 2022. Reviewed labs with patient. Normal BNP. Normal renal function. Potassium 3.9. HDL 69 with . We will add spironolactone, stop potassium, continue to work on low saturated fat diet although patient with no evidence of atherosclerosis. Patient made appointment with vascular.  October 28, 2022.Patient called about renewing her spironolactone.  It does seem to be helping her.  Labs from August were okay.  She is only taking the furosemide sparingly.  Okay to renew and I would like to see her in January which would be 1 year.  March 20, 2023.  First visit in 14 months.  Back in March of last year she did see vascular.  Venous Dopplers showed no evidence of DVT or SVT insufficiency in the left GSV only.  No arterial disease.  Recommended compression stockings and elevation in addition to weight loss.  They did discuss lymphedema therapy with patient prefer to go with the compression stockings first.  Now patient returns here.  EKG is sinus rhythm at 83 and a normal tracing other than a left anterior hemiblock.  Her initial blood pressure was elevated but was normal elsewhere in November.  Her weight is up 23 pounds from last year; she complains that she does not have that many activities to do her so having a cup of tea with cake etc. is one of her daily activities.  Still having issues with edema; she cannot put the stockings on herself and the aide only comes 4 days a week so those days she has been stockings.  Long discussion about lymphedema massage therapy as I think she would be a good candidate.  We also talked about dietitian referral.  She has a nightly cough; she gets up at night to go to the bathroom because of the furosemide and when she comes back she has something to eat and then lies down so this may be acid reflux and she will discuss this with Dr. Haley.  No interval syncope or cardiac issues or anything with COVID etc. March 19, 2024.  First visit in a year because she could not get her spironolactone refilled.  She went to see a rheumatologist in [place] where her daughter lives, Dr. Sebas Burroughs.  He has arranged for physical therapy for her.  He did labs January 19 and February.  In January potassium was 4.1 whereas February 19 it was 3.8 with a BUN of 28 and a creatinine of 0.81.  She is on colchicine 0.6 but when she had an acute attack Dr. Haley stopped the colchicine and eventually increase the allopurinol to 200 mg a day.  She is back on the colchicine now.  She had a hemoglobin A1c of 6.0 last month.  Her chemistries have been mostly within normal limits.  Latest uric acid was 5.8.  No lipid profile.  She managed to walk from the parking lot to my office but she uses 2 canes and did have some difficulty.  Denies exertional chest pain or shortness of breath.  Did have vascular look at her legs and again was told I believe venous insufficiency plus she just has large legs. Her blood pressure was acceptable and she refused to wait.  Her EKG was sinus rhythm at 85 with a left anterior hemiblock and poor R wave progression and 1 VPC.  Overall unchanged. She is on spironolactone 25 mg daily for me and furosemide 20 mg from Dr. Haley. She also mentioned a nocturnal cough which she assumed was postnasal drip but has not responded to over-the-counter decongestions etc.  I suggested that it could be reflux but she is afraid of taking PPIs so we will try Pepcid AC in the evening and see if that eventually makes a difference.

## 2024-03-19 NOTE — ASSESSMENT
[FreeTextEntry1] : We had assumed that Mrs. Brumfield had dyspnea as an anginal equivalent with a positive stress echo, however cardiac catheterization revealed totally normal coronary arteries. 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 I think she clearly did better on Ranexa than off Ranexa and had no more throat tightness; but then she tried being off the medication and did not notice a change although as mentioned, she is less active because of her back pain issues. Her pulmonary evaluation was unrevealing although her shortness of breath may be multifactorial. Currently she seems more limited by orthopedic issues. She is off her beta blocker as well. She is only on her diuretic for her edema. She is totally off statins as she cannot tolerate them, but given her age and totally normal coronary arteries at this point it is clearly acceptable for her to stay off the statins. We sent a VAP profile May, 2017 that was mostly low risk with large buoyant pattern A LDL. She did have an elevated Lp(a). She was still on a baby aspirin, but I explained that as primary prevention, there is no benefit women and she could stop that as well. She had 4 episodes of transient loss of vision in the same right eye and had extensive work-up at [facility] reviewed by neurology and there is absolutely no evidence of any atherosclerosis or prior CVA etc.  Exceedingly unlikely that this could be embolic such as from atrial fibrillation and had 4 episodes distinct in time but going to the same exact place for the same exact duration totally reversible etc.  I do not believe she needs to be monitored for atrial fibrillation at this time.  She saw a retina specialist as well.  Is now off all antiplatelet therapy. Her other issue besides her extensive DJD and disc disease is bilateral pedal edema probably from venous insufficiency.  I discussed with them a vascular work-up with possible laser procedure on her veins as well as a lymphedema massage therapy which they will look into. (I actually had virtually the same discussion with them last 2 times but nothing was done.  They did see vascular and she did recommend lymphedema therapy if the stockings did not work; the stockings definitely help but now with her weight gain she cannot put them on herself and therefore wears them just 4 days a week when the aide comes.  Weight gain is a major issue and I did refer her to the dietitian.  She will follow-up all these issues with Dr. Haley whom she is seeing next month.. Thankfully no cardiac or atherosclerotic issues at this time.  Blood pressure acceptable.  Her cough sounds more like esophageal reflux and perhaps she could try a PPI with dinner.  I told her to discuss that with Dr. Haley.   Finally returns after a year, only because they would not renew her spironolactone.  Has multiple issues that do limit her lifestyle but not clear that any of them are cardiac.  proBNP have always been normal except for 1 time last year but then repeat was normal again so that may have been a lab error.  There is also the possibility that she has INOCA but that does not seem to be clinical significant right now.  Even her large legs and her difficulty walking are probably not from fluid from venous insufficiency but more likely balance issues and orthopedic etc.  She now has a new rheumatologist from [place] that her sister has arranged and will continue to follow-up with him.  His labs were reviewed as mentioned.  I was willing to renew the spironolactone, will try to have future labs done as an outpatient at her home because of her difficulty getting out.  No further cardiac workup at this time.

## 2024-03-19 NOTE — PHYSICAL EXAM
[General Appearance - Well Developed] : well developed [Normal Appearance] : normal appearance [Well Groomed] : well groomed [General Appearance - Well Nourished] : well nourished [No Deformities] : no deformities [General Appearance - In No Acute Distress] : no acute distress [Normal Conjunctiva] : the conjunctiva exhibited no abnormalities [Eyelids - No Xanthelasma] : the eyelids demonstrated no xanthelasmas [Normal Oral Mucosa] : normal oral mucosa [No Oral Pallor] : no oral pallor [No Oral Cyanosis] : no oral cyanosis [Normal Jugular Venous A Waves Present] : normal jugular venous A waves present [Normal Jugular Venous V Waves Present] : normal jugular venous V waves present [No Jugular Venous Brennan A Waves] : no jugular venous brennan A waves [Respiration, Rhythm And Depth] : normal respiratory rhythm and effort [Exaggerated Use Of Accessory Muscles For Inspiration] : no accessory muscle use [Auscultation Breath Sounds / Voice Sounds] : lungs were clear to auscultation bilaterally [Heart Rate And Rhythm] : heart rate and rhythm were normal [Murmurs] : no murmurs present [Heart Sounds] : normal S1 and S2 [Abdomen Soft] : soft [Abdomen Tenderness] : non-tender [Abnormal Walk] : normal gait [Abdomen Mass (___ Cm)] : no abdominal mass palpated [Nail Clubbing] : no clubbing of the fingernails [Petechial Hemorrhages (___cm)] : no petechial hemorrhages [Cyanosis, Localized] : no localized cyanosis [] : no rash [Skin Color & Pigmentation] : normal skin color and pigmentation [Skin Lesions] : no skin lesions [No Venous Stasis] : no venous stasis [No Skin Ulcers] : no skin ulcer [No Xanthoma] : no  xanthoma was observed [Oriented To Time, Place, And Person] : oriented to person, place, and time [Affect] : the affect was normal [Mood] : the mood was normal [No Anxiety] : not feeling anxious [FreeTextEntry1] : Fair spirits, managing with her limited exertional capacity.

## 2024-04-07 ENCOUNTER — APPOINTMENT (OUTPATIENT)
Dept: AFTER HOURS CARE | Facility: EMERGENCY ROOM | Age: 89
End: 2024-04-07
Payer: MEDICARE

## 2024-04-07 ENCOUNTER — TRANSCRIPTION ENCOUNTER (OUTPATIENT)
Age: 89
End: 2024-04-07

## 2024-04-07 DIAGNOSIS — R05.9 COUGH, UNSPECIFIED: ICD-10-CM

## 2024-04-07 PROCEDURE — 99204 OFFICE O/P NEW MOD 45 MIN: CPT

## 2024-04-07 RX ORDER — FLUTICASONE PROPIONATE 50 UG/1
50 SPRAY, METERED NASAL TWICE DAILY
Qty: 1 | Refills: 0 | Status: ACTIVE | COMMUNITY
Start: 2024-04-07 | End: 1900-01-01

## 2024-04-07 NOTE — HISTORY OF PRESENT ILLNESS
[Home] : at home, [unfilled] , at the time of the visit. [Other Location: e.g. Home (Enter Location, City,State)___] : at [unfilled] [Verbal consent obtained from patient] : the patient, [unfilled] [FreeTextEntry8] : Elderly f with htn, seasonal allergies who presents with persistent cough x 2 weeks. She has tried xyzal without much relief and feels like she is having a persistent dry cough.  Cough is dry, nonproductive. She denies sick contacts and has an appointment with her pcp at the end of next month. She denies fever or chills.  She is a nonsmoker.

## 2024-04-07 NOTE — PHYSICAL EXAM
[TextEntry] : PLEASE SEE HPI FOR ADDITIONAL RELEVANT PHYSICAL EXAM FINDINGS GENERAL: elderly f in no acute distress, nontoxic, no stridor or hoarseness  HEAD: normocephalic EYES: no scleral icterus NECK: trachea midline, Full ROM RESP: no respiratory distress ABD: nondistended MSK: no gross deformity NEURO: alert & fully oriented SKIN: no rash PSYCH: cooperative, good insight, appropriate, fluent speech

## 2024-04-07 NOTE — PLAN
[FreeTextEntry1] :   Elderly f with seasonal allergies and postnasal drip. I advised patient to change her xyzal to claritin or zyrtec if it is not working and i sent her flonase nasal spray and tessalon pearls. She will call back if symptoms worsen at any time and will fu with her pcp Dr. Haley

## 2024-04-08 ENCOUNTER — NON-APPOINTMENT (OUTPATIENT)
Age: 89
End: 2024-04-08

## 2024-04-10 ENCOUNTER — APPOINTMENT (OUTPATIENT)
Dept: INTERNAL MEDICINE | Facility: CLINIC | Age: 89
End: 2024-04-10
Payer: MEDICARE

## 2024-04-10 ENCOUNTER — APPOINTMENT (OUTPATIENT)
Dept: INTERNAL MEDICINE | Facility: CLINIC | Age: 89
End: 2024-04-10

## 2024-04-10 ENCOUNTER — TRANSCRIPTION ENCOUNTER (OUTPATIENT)
Age: 89
End: 2024-04-10

## 2024-04-10 DIAGNOSIS — J06.9 ACUTE UPPER RESPIRATORY INFECTION, UNSPECIFIED: ICD-10-CM

## 2024-04-10 PROCEDURE — 99442: CPT | Mod: 93

## 2024-04-10 RX ORDER — ALBUTEROL SULFATE 90 UG/1
108 (90 BASE) AEROSOL, METERED RESPIRATORY (INHALATION)
Qty: 1 | Refills: 1 | Status: ACTIVE | COMMUNITY
Start: 2024-04-10 | End: 1900-01-01

## 2024-04-10 RX ORDER — METHYLPREDNISOLONE 4 MG/1
4 TABLET ORAL
Qty: 1 | Refills: 0 | Status: ACTIVE | COMMUNITY
Start: 2024-04-10 | End: 1900-01-01

## 2024-04-10 RX ORDER — PROMETHAZINE HYDROCHLORIDE AND DEXTROMETHORPHAN HYDROBROMIDE ORAL SOLUTION 15; 6.25 MG/5ML; MG/5ML
6.25-15 SOLUTION ORAL EVERY 6 HOURS
Qty: 1 | Refills: 0 | Status: ACTIVE | COMMUNITY
Start: 2019-05-20 | End: 1900-01-01

## 2024-04-10 RX ORDER — AZITHROMYCIN 250 MG/1
250 TABLET, FILM COATED ORAL
Qty: 1 | Refills: 0 | Status: ACTIVE | COMMUNITY
Start: 2024-04-10 | End: 1900-01-01

## 2024-05-03 ENCOUNTER — TRANSCRIPTION ENCOUNTER (OUTPATIENT)
Age: 89
End: 2024-05-03

## 2024-05-13 ENCOUNTER — APPOINTMENT (OUTPATIENT)
Dept: INTERNAL MEDICINE | Facility: CLINIC | Age: 89
End: 2024-05-13
Payer: MEDICARE

## 2024-05-13 VITALS
OXYGEN SATURATION: 98 % | BODY MASS INDEX: 47.12 KG/M2 | DIASTOLIC BLOOD PRESSURE: 70 MMHG | HEIGHT: 60 IN | SYSTOLIC BLOOD PRESSURE: 130 MMHG | TEMPERATURE: 98.6 F | HEART RATE: 78 BPM | WEIGHT: 240 LBS

## 2024-05-13 DIAGNOSIS — R26.9 UNSPECIFIED ABNORMALITIES OF GAIT AND MOBILITY: ICD-10-CM

## 2024-05-13 DIAGNOSIS — R06.2 WHEEZING: ICD-10-CM

## 2024-05-13 DIAGNOSIS — M10.9 GOUT, UNSPECIFIED: ICD-10-CM

## 2024-05-13 PROCEDURE — 94726 PLETHYSMOGRAPHY LUNG VOLUMES: CPT

## 2024-05-13 PROCEDURE — 99214 OFFICE O/P EST MOD 30 MIN: CPT | Mod: 25

## 2024-05-13 PROCEDURE — 94010 BREATHING CAPACITY TEST: CPT

## 2024-05-13 PROCEDURE — 94729 DIFFUSING CAPACITY: CPT

## 2024-05-13 RX ORDER — BENZONATATE 200 MG/1
200 CAPSULE ORAL 3 TIMES DAILY
Qty: 20 | Refills: 0 | Status: COMPLETED | COMMUNITY
Start: 2024-04-07 | End: 2024-05-13

## 2024-05-13 NOTE — ASSESSMENT
[FreeTextEntry1] : (1) Rheum - patient on allopurinol 200mg daily, and colchicine 0.6mg every other day per her rheumatologist.  Her uric acid is in the 6 range.  (2) Pulm - patient's recent asthmatic bronchitis improved, but she still has residual wheezing and dry cough.  She will be seeing Dr. Huff (Pulmonary) for ongoing management, and she had PFTs today.  (3) PT script given on patient request for gait and balance.  (4) Vasc - no indication of cellulitis at this time.  Patient advised that she will have some chronic changes.  She can keep the legs elevated when in bed.

## 2024-05-13 NOTE — HISTORY OF PRESENT ILLNESS
[Other: _____] : [unfilled] [de-identified] : Patient seeing a rheumatologist Dr. Goldberg ([place]).  She is currently on allopurinol 200mg daily, and colchicine 0.6mg every other day.  She felt considerable improvement from the Z-pack, Medrol dose pack, and inhaler from last month.  She continues to have some residual wheezing.  She did PFTs today ahead of a pulmonary consultation with Dr. Huff next week.  Patient has ongoing leg swelling.  She does not have significant warmth or new redness.  She notes that she received amoxicillin from her dentist recently for a dental infection.

## 2024-05-13 NOTE — PHYSICAL EXAM
[Normal] : soft, non-tender, non-distended, no masses palpated, no HSM and normal bowel sounds [de-identified] : Scattered wheezing bilaterally [de-identified] : 1-2+ bilateral LE edema.

## 2024-05-21 ENCOUNTER — APPOINTMENT (OUTPATIENT)
Dept: INTERNAL MEDICINE | Facility: CLINIC | Age: 89
End: 2024-05-21
Payer: MEDICARE

## 2024-05-21 ENCOUNTER — RX RENEWAL (OUTPATIENT)
Age: 89
End: 2024-05-21

## 2024-05-21 VITALS
BODY MASS INDEX: 47.12 KG/M2 | SYSTOLIC BLOOD PRESSURE: 142 MMHG | HEIGHT: 60 IN | HEART RATE: 88 BPM | WEIGHT: 240 LBS | DIASTOLIC BLOOD PRESSURE: 70 MMHG | TEMPERATURE: 97.9 F | OXYGEN SATURATION: 97 %

## 2024-05-21 PROCEDURE — 99205 OFFICE O/P NEW HI 60 MIN: CPT

## 2024-05-21 PROCEDURE — G2211 COMPLEX E/M VISIT ADD ON: CPT

## 2024-05-21 RX ORDER — LEVOCETIRIZINE DIHYDROCHLORIDE 5 MG/1
5 TABLET ORAL DAILY
Qty: 90 | Refills: 0 | Status: ACTIVE | COMMUNITY
Start: 2024-05-21 | End: 1900-01-01

## 2024-05-21 RX ORDER — FLUTICASONE PROPIONATE 50 UG/1
50 SPRAY, METERED NASAL TWICE DAILY
Qty: 48 | Refills: 0 | Status: ACTIVE | COMMUNITY
Start: 2024-05-21 | End: 1900-01-01

## 2024-05-24 NOTE — PROCEDURE
[FreeTextEntry1] : See scanned PFT report from today = report reviewed in detail with patient and daughters Has airflow obstruction pattern Normal lung volumes Moderate decrease in DLCO corrects for [facility]

## 2024-05-24 NOTE — ASSESSMENT
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 also triggering cough Will review all chest x-rays with radiology Prescription given to do noncontrast chest CT See scanned PFTs = reviewed with patient and family Vaccines reviewed No longer smokes DC declined Weight control Cardiology follow-up Consider sleep medicine evaluation to exclude MISBAH Trelegy 1 puff daily = rinse mouth after use Minimize pollen exposure Saline nasal rinses Flonase 1 spray per nostril twice daily Daily Xyzal She will return in follow-up in 2 to 4 weeks and as needed She will call if her status worsens or does not improve or for any pulmonary issues and return to be seen immediately All of the above was discussed with her and her daughters in detail and all of their questions were addressed and answered Her daughter took notes throughout the visit They verbally confirmed understanding of all of the above and agreement with the above plan

## 2024-05-24 NOTE — REVIEW OF SYSTEMS
[Postnasal Drip] : postnasal drip [Sinus Problems] : sinus problems [Cough] : cough [Sputum] : sputum [Wheezing] : wheezing [SOB on Exertion] : sob on exertion [Edema] : edema [Seasonal Allergies] : seasonal allergies [Menopause] : menopause [Obesity] : obesity [Negative] : Endocrine

## 2024-05-24 NOTE — PHYSICAL EXAM
[No Acute Distress] : no acute distress [Well Groomed] : well groomed [Normal Oropharynx] : normal oropharynx [Supple] : supple [No JVD] : no jvd [Normal Rate/Rhythm] : normal rate/rhythm [Normal S1, S2] : normal s1, s2 [No Resp Distress] : no resp distress [No Acc Muscle Use] : no acc muscle use [Normal Rhythm and Effort] : normal rhythm and effort [No Abnormalities] : no abnormalities [Benign] : benign [No Clubbing] : no clubbing [No Cyanosis] : no cyanosis [Normal Color/ Pigmentation] : normal color/ pigmentation [No Focal Deficits] : no focal deficits [Oriented x3] : oriented x3 [Normal Affect] : normal affect [TextBox_2] : Overweight [TextBox_11] : No sinus tenderness; pupils reactive to light; extraocular muscles intact; ear cerumen [TextBox_44] : No stridor [TextBox_54] : Mild edema; no cords [TextBox_68] : R = 16; decent air entry; scattered wheezes posteriorly [TextBox_99] : In WC

## 2024-05-24 NOTE — HISTORY OF PRESENT ILLNESS
[Former] : former [< 20 pack-years] : < 20 pack-years [Never] : never [Wheezing] : wheezing [Nonspecific Pain, Swelling, And Stiffness] : chest pain [Fever] : fever [Difficulty Breathing During Exertion] : dyspnea on exertion [Feelings Of Weakness On Exertion] : exercise intolerance [Nasal Passage Blockage (Stuffiness)] : edema [Cough] : coughing [Does not check] : The patient is not checking peak flow at home [3  -  Moderate] : 3, moderate [Class III - Marked Limitation in Activity] : III [Reviewed no changes] : Reviewed no changes [Wt Gain ___ kg] : No recent weight gain [Wt Loss ___ kg] : No recent weight loss [More Frequent Use Needed Recently] : Patient reports no recent increase in frequency of [TextBox_4] : Chief complaint persistent cough Denies any history of pneumonia, pleurisy, asthma, COPD, tuberculosis, MISBAH, pneumothorax, lung cancer, or pulmonary embolism.  Has had bouts of bronchitis.  Denies any prior history of pulmonary surgery.  Father had COPD.   and a homemaker.  Smoked 1 pack/day of cigarettes for 12 years.  Stop smoking 55 years ago.  Denies alcohol or drug use.  Denies any pets in her home.  Has not had any recent travel.  Denies any occupational exposures.  Had recent PFTs 2024.  Had chest x-ray 2024.  Reports that she is up-to-date with all vaccines. Family reports that she has had a cough for months/years.  Her cough has worsened recently after an acute upper respiratory illness.  She denies any chest pain.  Her cough tends to be dry.  She denies any hemoptysis.  She has not had any recent fevers or chills.  She denies any wheezing or shortness of breath.  She denies any reflux disease.  She denies any calf pain.  She has chronic lower extremity edema. [TextBox_11] : 1 [TextBox_13] : 12 [YearQuit] : 55 years ago [de-identified] : Denies hemoptysis [ESS] : 0 [TextBox_57] : Clear chest x-ray 2024 [AdvancecareDate] : 05/24

## 2024-06-07 ENCOUNTER — RX RENEWAL (OUTPATIENT)
Age: 89
End: 2024-06-07

## 2024-06-07 RX ORDER — FUROSEMIDE 20 MG/1
20 TABLET ORAL DAILY
Qty: 90 | Refills: 0 | Status: ACTIVE | COMMUNITY
Start: 2021-12-15 | End: 1900-01-01

## 2024-06-10 ENCOUNTER — APPOINTMENT (OUTPATIENT)
Dept: CT IMAGING | Facility: IMAGING CENTER | Age: 89
End: 2024-06-10
Payer: MEDICARE

## 2024-06-10 ENCOUNTER — OUTPATIENT (OUTPATIENT)
Dept: OUTPATIENT SERVICES | Facility: HOSPITAL | Age: 89
LOS: 1 days | End: 2024-06-10
Payer: MEDICARE

## 2024-06-10 DIAGNOSIS — H26.9 UNSPECIFIED CATARACT: Chronic | ICD-10-CM

## 2024-06-10 DIAGNOSIS — R05.9 COUGH, UNSPECIFIED: ICD-10-CM

## 2024-06-10 DIAGNOSIS — G56.00 CARPAL TUNNEL SYNDROME, UNSPECIFIED UPPER LIMB: Chronic | ICD-10-CM

## 2024-06-10 DIAGNOSIS — Z96.60 PRESENCE OF UNSPECIFIED ORTHOPEDIC JOINT IMPLANT: Chronic | ICD-10-CM

## 2024-06-10 DIAGNOSIS — Z96.659 PRESENCE OF UNSPECIFIED ARTIFICIAL KNEE JOINT: Chronic | ICD-10-CM

## 2024-06-10 PROCEDURE — 71250 CT THORAX DX C-: CPT

## 2024-06-10 PROCEDURE — 71250 CT THORAX DX C-: CPT | Mod: 26,MH

## 2024-06-20 ENCOUNTER — APPOINTMENT (OUTPATIENT)
Dept: INTERNAL MEDICINE | Facility: CLINIC | Age: 89
End: 2024-06-20
Payer: MEDICARE

## 2024-06-20 VITALS
TEMPERATURE: 98 F | OXYGEN SATURATION: 96 % | DIASTOLIC BLOOD PRESSURE: 62 MMHG | BODY MASS INDEX: 46.87 KG/M2 | SYSTOLIC BLOOD PRESSURE: 110 MMHG | HEART RATE: 62 BPM | WEIGHT: 240 LBS

## 2024-06-20 DIAGNOSIS — J31.0 CHRONIC RHINITIS: ICD-10-CM

## 2024-06-20 DIAGNOSIS — J30.2 OTHER SEASONAL ALLERGIC RHINITIS: ICD-10-CM

## 2024-06-20 DIAGNOSIS — R09.82 POSTNASAL DRIP: ICD-10-CM

## 2024-06-20 DIAGNOSIS — R05.9 COUGH, UNSPECIFIED: ICD-10-CM

## 2024-06-20 DIAGNOSIS — J45.909 UNSPECIFIED ASTHMA, UNCOMPLICATED: ICD-10-CM

## 2024-06-20 PROCEDURE — G2211 COMPLEX E/M VISIT ADD ON: CPT

## 2024-06-20 PROCEDURE — 99213 OFFICE O/P EST LOW 20 MIN: CPT

## 2024-06-20 RX ORDER — BENZONATATE 100 MG/1
100 CAPSULE ORAL 3 TIMES DAILY
Qty: 30 | Refills: 2 | Status: ACTIVE | COMMUNITY
Start: 2021-09-24 | End: 1900-01-01

## 2024-06-20 RX ORDER — FLUTICASONE FUROATE, UMECLIDINIUM BROMIDE AND VILANTEROL TRIFENATATE 200; 62.5; 25 UG/1; UG/1; UG/1
200-62.5-25 POWDER RESPIRATORY (INHALATION)
Qty: 60 | Refills: 2 | Status: ACTIVE | COMMUNITY
Start: 2024-05-21 | End: 1900-01-01

## 2024-06-20 NOTE — HISTORY OF PRESENT ILLNESS
[Former] : former [< 20 pack-years] : < 20 pack-years [Never] : never [Wheezing] : wheezing [Nonspecific Pain, Swelling, And Stiffness] : chest pain [Fever] : fever [Difficulty Breathing During Exertion] : dyspnea on exertion [Feelings Of Weakness On Exertion] : exercise intolerance [Nasal Passage Blockage (Stuffiness)] : edema [Does not check] : The patient is not checking peak flow at home [3  -  Moderate] : 3, moderate [Class III - Marked Limitation in Activity] : III [Reviewed no changes] : Reviewed no changes [Cough] : coughing [Wt Gain ___ kg] : No recent weight gain [Wt Loss ___ kg] : No recent weight loss [More Frequent Use Needed Recently] : Patient reports no recent increase in frequency of [TextBox_4] : Chief complaint persistent cough Denies any history of pneumonia, pleurisy, asthma, COPD, tuberculosis, MISBAH, pneumothorax, lung cancer, or pulmonary embolism.  Has had bouts of bronchitis.  Denies any prior history of pulmonary surgery.  Father had COPD.   and a homemaker.  Smoked 1 pack/day of cigarettes for 12 years.  Stop smoking 55 years ago.  Denies alcohol or drug use.  Denies any pets in her home.  Has not had any recent travel.  Denies any occupational exposures.  Had recent PFTs 2024.  Had chest x-ray 2024.  Reports that she is up-to-date with all vaccines. Family reports that she has had a cough for months/years.  Her cough has worsened recently after an acute upper respiratory illness.  She denies any chest pain.  Her cough tends to be dry.  She denies any hemoptysis.  She has not had any recent fevers or chills.  She denies any wheezing or shortness of breath.  She denies any reflux disease.  She denies any calf pain.  She has chronic lower extremity edema.  June 20, 2024 visit: Feels improved since last visit with me. Denies any chest pain.  Cough is much improved but remains dry and intermittent.  Denies hemoptysis.  Denies fevers or chills.  Denies any shortness of breath or wheezing.  She does have postnasal drip. [TextBox_11] : 1 [TextBox_13] : 12 [YearQuit] : 55 years ago [de-identified] : Denies hemoptysis [ESS] : 0 [TextBox_42] : 06/24 [TextBox_57] : Clear chest x-ray 2024 [AdvancecareDate] : 06/24

## 2024-06-20 NOTE — REASON FOR VISIT
[Initial] : an initial visit [Cough] : cough [Family Member] : family member [Follow-Up] : a follow-up visit

## 2024-06-20 NOTE — ASSESSMENT
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 also triggering cough Recent chest CT report reviewed in detail with patient and her daughters As per radiology appears to have mild bronchiectasis which may explain chronic daily cough///likely had recent acute exacerbation with increased cough, sputum production, and bronchospasm in setting of URI See scanned PFTs Unable to provide sputum for cultures Vaccines reviewed No longer smokes ID declined Weight control Cardiology follow-up Consider sleep medicine evaluation to exclude MISBAH Trelegy 1 puff daily = rinse mouth after use Use sputum clearance device once to twice daily Minimize pollen exposure Saline nasal rinses Flonase 1 spray per nostril twice daily Daily Xyzal She will return in follow-up in 12 weeks and as needed She will call if her status worsens or does not improve or for any pulmonary issues and return to be seen immediately All of the above was discussed with her and her daughters in detail and all of their questions were addressed and answered Her daughter taped our visit via phone They verbally confirmed understanding of all of the above and agreement with the above plan

## 2024-06-20 NOTE — PROCEDURE
[FreeTextEntry1] : See scanned PFT report  Has airflow obstruction pattern Normal lung volumes Moderate decrease in DLCO corrects for [facility]

## 2024-06-20 NOTE — PHYSICAL EXAM
[No Acute Distress] : no acute distress [Well Groomed] : well groomed [Normal Oropharynx] : normal oropharynx [Supple] : supple [No JVD] : no jvd [Normal Rate/Rhythm] : normal rate/rhythm [Normal S1, S2] : normal s1, s2 [No Resp Distress] : no resp distress [No Acc Muscle Use] : no acc muscle use [Normal Rhythm and Effort] : normal rhythm and effort [No Abnormalities] : no abnormalities [Benign] : benign [No Clubbing] : no clubbing [No Cyanosis] : no cyanosis [Normal Color/ Pigmentation] : normal color/ pigmentation [No Focal Deficits] : no focal deficits [Oriented x3] : oriented x3 [Normal Affect] : normal affect [TextBox_2] : Overweight///examined in wheelchair [TextBox_11] : No sinus tenderness; pupils reactive to light; extraocular muscles intact; ear cerumen [TextBox_44] : No stridor [TextBox_54] : Mild edema; no cords [TextBox_68] : R = 16; decent air entry; no wheezing noted today [TextBox_99] : In WC

## 2024-08-26 ENCOUNTER — RX RENEWAL (OUTPATIENT)
Age: 89
End: 2024-08-26

## 2024-08-28 ENCOUNTER — APPOINTMENT (OUTPATIENT)
Dept: INTERNAL MEDICINE | Facility: CLINIC | Age: 89
End: 2024-08-28

## 2024-09-05 ENCOUNTER — APPOINTMENT (OUTPATIENT)
Dept: INTERNAL MEDICINE | Facility: CLINIC | Age: 89
End: 2024-09-05
Payer: MEDICARE

## 2024-09-05 VITALS
OXYGEN SATURATION: 96 % | DIASTOLIC BLOOD PRESSURE: 70 MMHG | WEIGHT: 240 LBS | SYSTOLIC BLOOD PRESSURE: 140 MMHG | TEMPERATURE: 97.8 F | HEART RATE: 96 BPM | BODY MASS INDEX: 44.16 KG/M2 | HEIGHT: 62 IN

## 2024-09-05 DIAGNOSIS — J30.2 OTHER SEASONAL ALLERGIC RHINITIS: ICD-10-CM

## 2024-09-05 DIAGNOSIS — R05.9 COUGH, UNSPECIFIED: ICD-10-CM

## 2024-09-05 DIAGNOSIS — R09.82 POSTNASAL DRIP: ICD-10-CM

## 2024-09-05 DIAGNOSIS — J47.9 BRONCHIECTASIS, UNCOMPLICATED: ICD-10-CM

## 2024-09-05 DIAGNOSIS — Z87.898 PERSONAL HISTORY OF OTHER SPECIFIED CONDITIONS: ICD-10-CM

## 2024-09-05 PROCEDURE — G2211 COMPLEX E/M VISIT ADD ON: CPT

## 2024-09-05 PROCEDURE — 99213 OFFICE O/P EST LOW 20 MIN: CPT

## 2024-09-05 NOTE — REVIEW OF SYSTEMS
[Postnasal Drip] : postnasal drip [Sinus Problems] : sinus problems [Cough] : cough [SOB on Exertion] : sob on exertion [Edema] : edema [Seasonal Allergies] : seasonal allergies [Menopause] : menopause [Obesity] : obesity [Negative] : Endocrine

## 2024-09-05 NOTE — REASON FOR VISIT
[Follow-Up] : a follow-up visit [Cough] : cough [Family Member] : family member [TextBox_13] : Dr. Gonzalo Haley

## 2024-09-05 NOTE — HISTORY OF PRESENT ILLNESS
[Former] : former [< 20 pack-years] : < 20 pack-years [Never] : never [Wheezing] : wheezing [Nonspecific Pain, Swelling, And Stiffness] : chest pain [Fever] : fever [Difficulty Breathing During Exertion] : dyspnea on exertion [Feelings Of Weakness On Exertion] : exercise intolerance [Cough] : coughing [Nasal Passage Blockage (Stuffiness)] : edema [Wt Gain ___ kg] : No recent weight gain [Wt Loss ___ kg] : No recent weight loss [Does not check] : The patient is not checking peak flow at home [More Frequent Use Needed Recently] : Patient reports no recent increase in frequency of [3  -  Moderate] : 3, moderate [Class III - Marked Limitation in Activity] : III [TextBox_4] : Chief complaint persistent cough Denies any history of pneumonia, pleurisy, asthma, COPD, tuberculosis, MISBAH, pneumothorax, lung cancer, or pulmonary embolism.  Has had bouts of bronchitis.  Denies any prior history of pulmonary surgery.  Father had COPD.   and a homemaker.  Smoked 1 pack/day of cigarettes for 12 years.  Stop smoking 55 years ago.  Denies alcohol or drug use.  Denies any pets in her home.  Has not had any recent travel.  Denies any occupational exposures.  Had recent PFTs 2024.  Had chest x-ray 2024.  Reports that she is up-to-date with all vaccines. Family reports that she has had a cough for months/years.  Her cough has worsened recently after an acute upper respiratory illness.  She denies any chest pain.  Her cough tends to be dry.  She denies any hemoptysis.  She has not had any recent fevers or chills.  She denies any wheezing or shortness of breath.  She denies any reflux disease.  She denies any calf pain.  She has chronic lower extremity edema.  June 20, 2024 visit: Feels improved since last visit with me. Denies any chest pain.  Cough is much improved but remains dry and intermittent.  Denies hemoptysis.  Denies fevers or chills.  Denies any shortness of breath or wheezing.  She does have postnasal drip.  September 5, 2024 visit: Presently using daily Flonase and antihistamine.  Also uses Trelegy daily.  Occasionally uses benzonatate.  Reports that her cough is improved this week.  Denies any chest pain.  Her secretions remain clear without hemoptysis.  She continues to feel that her cough is triggered by postnasal drip.  However she feels that the regimen she is using keeps it fairly well-controlled.  She reports that she is able to sleep at night now.  She denies any increased shortness of breath or significant wheezing.  She denies any fevers or chills. [TextBox_11] : 1 [YearQuit] : 55 years ago [TextBox_13] : 12 [de-identified] : Denies hemoptysis [ESS] : 0 [TextBox_42] : 06/24 [TextBox_57] : Clear chest x-ray 2024 [Reviewed no changes] : Reviewed no changes [AdvancecareDate] : 09/24

## 2024-09-05 NOTE — PHYSICAL EXAM
[No Acute Distress] : no acute distress [Well Groomed] : well groomed [Normal Oropharynx] : normal oropharynx [Supple] : supple [No JVD] : no jvd [Normal Rate/Rhythm] : normal rate/rhythm [Normal S1, S2] : normal s1, s2 [No Resp Distress] : no resp distress [No Acc Muscle Use] : no acc muscle use [Normal Rhythm and Effort] : normal rhythm and effort [No Abnormalities] : no abnormalities [Benign] : benign [Normal Gait] : normal gait [No Clubbing] : no clubbing [No Cyanosis] : no cyanosis [Normal Color/ Pigmentation] : normal color/ pigmentation [No Focal Deficits] : no focal deficits [Oriented x3] : oriented x3 [Normal Affect] : normal affect [TextBox_2] : Overweight///examined in chair at her request [TextBox_11] : No sinus tenderness; pupils reactive to light; extraocular muscles intact; ear cerumen [TextBox_44] : No stridor [TextBox_54] : Mild edema; no cords; repeat blood pressure by me 128/78 [TextBox_68] : R = 16; decent air entry; no wheezing noted today [TextBox_99] : Ambulates with walker

## 2024-09-05 NOTE — ASSESSMENT
[FreeTextEntry1] : Chronic cough Tends to exacerbate in the setting of URIs Has active seasonal allergies with rhinitis and postnasal drip = likely triggering cough See scanned chest CT report = As per radiology appears to have mild bronchiectasis which may explain chronic daily cough and exacerbations in setting of acute URI See scanned PFTs Unable to provide sputum for cultures Vaccines reviewed No longer smokes [place] declined Weight control Cardiology follow-up Consider sleep medicine evaluation to exclude MISBAH Trelegy 1 puff daily = rinse mouth after use Use sputum clearance device once to twice daily Minimize pollen exposure Saline nasal rinses Flonase 1 spray per nostril twice daily Daily Xyzal She will return in follow-up in 12 weeks and as needed She will call if her status worsens or does not improve or for any pulmonary issues and return to be seen immediately All of the above was discussed with her and her daughter in detail and all of their questions were addressed and answered They verbally confirmed understanding of all of the above and agreement with the above plan

## 2024-09-19 ENCOUNTER — NON-APPOINTMENT (OUTPATIENT)
Age: 89
End: 2024-09-19

## 2024-09-23 ENCOUNTER — RX RENEWAL (OUTPATIENT)
Age: 89
End: 2024-09-23

## 2024-09-30 ENCOUNTER — RX RENEWAL (OUTPATIENT)
Age: 89
End: 2024-09-30

## 2024-10-08 ENCOUNTER — APPOINTMENT (OUTPATIENT)
Dept: CARDIOLOGY | Facility: CLINIC | Age: 89
End: 2024-10-08

## 2024-11-04 ENCOUNTER — APPOINTMENT (OUTPATIENT)
Dept: INTERNAL MEDICINE | Facility: CLINIC | Age: 89
End: 2024-11-04

## 2024-11-04 ENCOUNTER — RX RENEWAL (OUTPATIENT)
Age: 89
End: 2024-11-04

## 2024-11-11 ENCOUNTER — NON-APPOINTMENT (OUTPATIENT)
Age: 89
End: 2024-11-11

## 2024-11-11 ENCOUNTER — APPOINTMENT (OUTPATIENT)
Dept: INTERNAL MEDICINE | Facility: CLINIC | Age: 89
End: 2024-11-11
Payer: MEDICARE

## 2024-11-11 VITALS
BODY MASS INDEX: 44.16 KG/M2 | TEMPERATURE: 98.6 F | WEIGHT: 240 LBS | SYSTOLIC BLOOD PRESSURE: 135 MMHG | DIASTOLIC BLOOD PRESSURE: 78 MMHG | OXYGEN SATURATION: 96 % | HEART RATE: 84 BPM | HEIGHT: 62 IN

## 2024-11-11 DIAGNOSIS — I10 ESSENTIAL (PRIMARY) HYPERTENSION: ICD-10-CM

## 2024-11-11 DIAGNOSIS — Z00.00 ENCOUNTER FOR GENERAL ADULT MEDICAL EXAMINATION W/OUT ABNORMAL FINDINGS: ICD-10-CM

## 2024-11-11 DIAGNOSIS — K21.9 GASTRO-ESOPHAGEAL REFLUX DISEASE W/OUT ESOPHAGITIS: ICD-10-CM

## 2024-11-11 DIAGNOSIS — E78.5 HYPERLIPIDEMIA, UNSPECIFIED: ICD-10-CM

## 2024-11-11 DIAGNOSIS — M10.9 GOUT, UNSPECIFIED: ICD-10-CM

## 2024-11-11 PROCEDURE — 36415 COLL VENOUS BLD VENIPUNCTURE: CPT

## 2024-11-11 PROCEDURE — G0439: CPT

## 2024-11-12 LAB
25(OH)D3 SERPL-MCNC: 34.5 NG/ML
ALBUMIN SERPL ELPH-MCNC: 4.1 G/DL
ALP BLD-CCNC: 106 U/L
ALT SERPL-CCNC: 17 U/L
ANION GAP SERPL CALC-SCNC: 15 MMOL/L
APPEARANCE: CLEAR
AST SERPL-CCNC: 20 U/L
BACTERIA: ABNORMAL /HPF
BASOPHILS # BLD AUTO: 0.08 K/UL
BASOPHILS NFR BLD AUTO: 1 %
BILIRUB SERPL-MCNC: 0.4 MG/DL
BILIRUBIN URINE: NEGATIVE
BLOOD URINE: NEGATIVE
BUN SERPL-MCNC: 24 MG/DL
CALCIUM SERPL-MCNC: 9.5 MG/DL
CAST: 0 /LPF
CHLORIDE SERPL-SCNC: 104 MMOL/L
CHOLEST SERPL-MCNC: 229 MG/DL
CO2 SERPL-SCNC: 24 MMOL/L
COLOR: YELLOW
CREAT SERPL-MCNC: 0.79 MG/DL
EGFR: 72 ML/MIN/1.73M2
EOSINOPHIL # BLD AUTO: 0.39 K/UL
EOSINOPHIL NFR BLD AUTO: 4.8 %
EPITHELIAL CELLS: 4 /HPF
ESTIMATED AVERAGE GLUCOSE: 131 MG/DL
GLUCOSE QUALITATIVE U: NEGATIVE MG/DL
GLUCOSE SERPL-MCNC: 114 MG/DL
HBA1C MFR BLD HPLC: 6.2 %
HCT VFR BLD CALC: 40.7 %
HDLC SERPL-MCNC: 58 MG/DL
HGB BLD-MCNC: 13.5 G/DL
IMM GRANULOCYTES NFR BLD AUTO: 0.4 %
KETONES URINE: NEGATIVE MG/DL
LDLC SERPL CALC-MCNC: 149 MG/DL
LEUKOCYTE ESTERASE URINE: ABNORMAL
LYMPHOCYTES # BLD AUTO: 1.46 K/UL
LYMPHOCYTES NFR BLD AUTO: 17.9 %
MAN DIFF?: NORMAL
MCHC RBC-ENTMCNC: 31 PG
MCHC RBC-ENTMCNC: 33.2 G/DL
MCV RBC AUTO: 93.3 FL
MICROSCOPIC-UA: NORMAL
MONOCYTES # BLD AUTO: 0.82 K/UL
MONOCYTES NFR BLD AUTO: 10.1 %
NEUTROPHILS # BLD AUTO: 5.36 K/UL
NEUTROPHILS NFR BLD AUTO: 65.8 %
NITRITE URINE: POSITIVE
NONHDLC SERPL-MCNC: 170 MG/DL
PH URINE: 7
PLATELET # BLD AUTO: 298 K/UL
POTASSIUM SERPL-SCNC: 4 MMOL/L
PROT SERPL-MCNC: 6.6 G/DL
PROTEIN URINE: NEGATIVE MG/DL
RBC # BLD: 4.36 M/UL
RBC # FLD: 14.9 %
RED BLOOD CELLS URINE: 3 /HPF
SODIUM SERPL-SCNC: 143 MMOL/L
SPECIFIC GRAVITY URINE: 1.02
T4 FREE SERPL-MCNC: 1.1 NG/DL
TRIGL SERPL-MCNC: 123 MG/DL
TSH SERPL-ACNC: 2.57 UIU/ML
URATE SERPL-MCNC: 5.5 MG/DL
UROBILINOGEN URINE: 0.2 MG/DL
VIT B12 SERPL-MCNC: 624 PG/ML
WBC # FLD AUTO: 8.14 K/UL
WHITE BLOOD CELLS URINE: 35 /HPF

## 2024-11-14 ENCOUNTER — APPOINTMENT (OUTPATIENT)
Dept: INTERNAL MEDICINE | Facility: CLINIC | Age: 89
End: 2024-11-14
Payer: MEDICARE

## 2024-11-14 VITALS
TEMPERATURE: 97.9 F | SYSTOLIC BLOOD PRESSURE: 120 MMHG | DIASTOLIC BLOOD PRESSURE: 70 MMHG | OXYGEN SATURATION: 96 % | RESPIRATION RATE: 16 BRPM | HEART RATE: 88 BPM

## 2024-11-14 DIAGNOSIS — R05.9 COUGH, UNSPECIFIED: ICD-10-CM

## 2024-11-14 DIAGNOSIS — R09.82 POSTNASAL DRIP: ICD-10-CM

## 2024-11-14 DIAGNOSIS — J47.9 BRONCHIECTASIS, UNCOMPLICATED: ICD-10-CM

## 2024-11-14 DIAGNOSIS — J30.2 OTHER SEASONAL ALLERGIC RHINITIS: ICD-10-CM

## 2024-11-14 PROCEDURE — 90662 IIV NO PRSV INCREASED AG IM: CPT

## 2024-11-14 PROCEDURE — G0008: CPT

## 2024-11-14 PROCEDURE — G2211 COMPLEX E/M VISIT ADD ON: CPT

## 2024-11-14 PROCEDURE — 99214 OFFICE O/P EST MOD 30 MIN: CPT

## 2024-11-18 ENCOUNTER — APPOINTMENT (OUTPATIENT)
Dept: OTOLARYNGOLOGY | Facility: CLINIC | Age: 89
End: 2024-11-18
Payer: MEDICARE

## 2024-11-18 VITALS
TEMPERATURE: 97.8 F | BODY MASS INDEX: 44.16 KG/M2 | HEIGHT: 62 IN | HEART RATE: 79 BPM | WEIGHT: 240 LBS | SYSTOLIC BLOOD PRESSURE: 148 MMHG | DIASTOLIC BLOOD PRESSURE: 84 MMHG

## 2024-11-18 DIAGNOSIS — H90.3 SENSORINEURAL HEARING LOSS, BILATERAL: ICD-10-CM

## 2024-11-18 DIAGNOSIS — H61.23 IMPACTED CERUMEN, BILATERAL: ICD-10-CM

## 2024-11-18 DIAGNOSIS — R48.1 AGNOSIA: ICD-10-CM

## 2024-11-18 PROCEDURE — 99203 OFFICE O/P NEW LOW 30 MIN: CPT | Mod: 25

## 2024-11-18 PROCEDURE — 69210 REMOVE IMPACTED EAR WAX UNI: CPT

## 2024-12-02 ENCOUNTER — RX RENEWAL (OUTPATIENT)
Age: 89
End: 2024-12-02

## 2024-12-03 ENCOUNTER — RX RENEWAL (OUTPATIENT)
Age: 89
End: 2024-12-03

## 2024-12-12 ENCOUNTER — APPOINTMENT (OUTPATIENT)
Dept: CARDIOLOGY | Facility: CLINIC | Age: 89
End: 2024-12-12
Payer: MEDICARE

## 2024-12-12 VITALS
HEART RATE: 84 BPM | WEIGHT: 240 LBS | BODY MASS INDEX: 44.16 KG/M2 | SYSTOLIC BLOOD PRESSURE: 132 MMHG | DIASTOLIC BLOOD PRESSURE: 68 MMHG | HEIGHT: 62 IN

## 2024-12-12 DIAGNOSIS — R73.03 PREDIABETES.: ICD-10-CM

## 2024-12-12 DIAGNOSIS — E66.9 LYMPHEDEMA, NOT ELSEWHERE CLASSIFIED: ICD-10-CM

## 2024-12-12 DIAGNOSIS — I89.0 LYMPHEDEMA, NOT ELSEWHERE CLASSIFIED: ICD-10-CM

## 2024-12-12 DIAGNOSIS — E78.5 HYPERLIPIDEMIA, UNSPECIFIED: ICD-10-CM

## 2024-12-12 DIAGNOSIS — G45.3 AMAUROSIS FUGAX: ICD-10-CM

## 2024-12-12 DIAGNOSIS — I10 ESSENTIAL (PRIMARY) HYPERTENSION: ICD-10-CM

## 2024-12-12 PROCEDURE — 99214 OFFICE O/P EST MOD 30 MIN: CPT

## 2024-12-12 PROCEDURE — G2211 COMPLEX E/M VISIT ADD ON: CPT

## 2024-12-12 PROCEDURE — 93000 ELECTROCARDIOGRAM COMPLETE: CPT

## 2025-01-09 ENCOUNTER — NON-APPOINTMENT (OUTPATIENT)
Age: 89
End: 2025-01-09

## 2025-01-09 ENCOUNTER — APPOINTMENT (OUTPATIENT)
Dept: CARDIOLOGY | Facility: CLINIC | Age: 89
End: 2025-01-09
Payer: MEDICARE

## 2025-01-09 VITALS — DIASTOLIC BLOOD PRESSURE: 70 MMHG | HEART RATE: 85 BPM | OXYGEN SATURATION: 98 % | SYSTOLIC BLOOD PRESSURE: 168 MMHG

## 2025-01-09 PROCEDURE — G2211 COMPLEX E/M VISIT ADD ON: CPT

## 2025-01-09 PROCEDURE — 99212 OFFICE O/P EST SF 10 MIN: CPT

## 2025-01-09 PROCEDURE — 93000 ELECTROCARDIOGRAM COMPLETE: CPT

## 2025-02-10 ENCOUNTER — RX RENEWAL (OUTPATIENT)
Age: 89
End: 2025-02-10

## 2025-02-10 RX ORDER — ALLOPURINOL 100 MG/1
100 TABLET ORAL
Qty: 180 | Refills: 3 | Status: ACTIVE | COMMUNITY
Start: 2025-02-10 | End: 1900-01-01

## 2025-04-24 ENCOUNTER — APPOINTMENT (OUTPATIENT)
Dept: INTERNAL MEDICINE | Facility: CLINIC | Age: 89
End: 2025-04-24
Payer: MEDICARE

## 2025-04-24 DIAGNOSIS — M19.079 PRIMARY OSTEOARTHRITIS, UNSPECIFIED ANKLE AND FOOT: ICD-10-CM

## 2025-04-24 PROCEDURE — 99213 OFFICE O/P EST LOW 20 MIN: CPT | Mod: 93

## 2025-04-24 PROCEDURE — G2211 COMPLEX E/M VISIT ADD ON: CPT | Mod: 93

## 2025-04-29 ENCOUNTER — LABORATORY RESULT (OUTPATIENT)
Age: 89
End: 2025-04-29

## 2025-04-30 ENCOUNTER — LABORATORY RESULT (OUTPATIENT)
Age: 89
End: 2025-04-30

## 2025-05-01 ENCOUNTER — LABORATORY RESULT (OUTPATIENT)
Age: 89
End: 2025-05-01

## 2025-05-02 ENCOUNTER — NON-APPOINTMENT (OUTPATIENT)
Age: 89
End: 2025-05-02

## 2025-05-02 ENCOUNTER — APPOINTMENT (OUTPATIENT)
Dept: CARDIOLOGY | Facility: CLINIC | Age: 89
End: 2025-05-02
Payer: MEDICARE

## 2025-05-02 DIAGNOSIS — R60.9 EDEMA, UNSPECIFIED: ICD-10-CM

## 2025-05-02 PROCEDURE — 93970 EXTREMITY STUDY: CPT

## 2025-05-13 ENCOUNTER — APPOINTMENT (OUTPATIENT)
Dept: INTERNAL MEDICINE | Facility: CLINIC | Age: 89
End: 2025-05-13

## 2025-05-13 ENCOUNTER — APPOINTMENT (OUTPATIENT)
Dept: INTERNAL MEDICINE | Facility: CLINIC | Age: 89
End: 2025-05-13
Payer: MEDICARE

## 2025-05-13 PROCEDURE — 36415 COLL VENOUS BLD VENIPUNCTURE: CPT

## 2025-05-15 ENCOUNTER — APPOINTMENT (OUTPATIENT)
Dept: INTERNAL MEDICINE | Facility: CLINIC | Age: 89
End: 2025-05-15
Payer: MEDICARE

## 2025-05-15 DIAGNOSIS — R60.9 EDEMA, UNSPECIFIED: ICD-10-CM

## 2025-05-15 DIAGNOSIS — E78.5 HYPERLIPIDEMIA, UNSPECIFIED: ICD-10-CM

## 2025-05-15 DIAGNOSIS — R73.03 PREDIABETES.: ICD-10-CM

## 2025-05-15 DIAGNOSIS — M10.9 GOUT, UNSPECIFIED: ICD-10-CM

## 2025-05-15 DIAGNOSIS — M79.673 PAIN IN UNSPECIFIED FOOT: ICD-10-CM

## 2025-05-15 LAB
ALBUMIN SERPL ELPH-MCNC: 4.2 G/DL
ALP BLD-CCNC: 103 U/L
ALT SERPL-CCNC: 23 U/L
ANION GAP SERPL CALC-SCNC: 16 MMOL/L
AST SERPL-CCNC: 27 U/L
BILIRUB SERPL-MCNC: 0.4 MG/DL
BUN SERPL-MCNC: 21 MG/DL
CALCIUM SERPL-MCNC: 9.8 MG/DL
CHLORIDE SERPL-SCNC: 104 MMOL/L
CHOLEST SERPL-MCNC: 213 MG/DL
CO2 SERPL-SCNC: 23 MMOL/L
CREAT SERPL-MCNC: 0.8 MG/DL
EGFRCR SERPLBLD CKD-EPI 2021: 70 ML/MIN/1.73M2
ESTIMATED AVERAGE GLUCOSE: 126 MG/DL
GLUCOSE SERPL-MCNC: 92 MG/DL
HBA1C MFR BLD HPLC: 6 %
HCT VFR BLD CALC: 41.4 %
HDLC SERPL-MCNC: 62 MG/DL
HGB BLD-MCNC: 13.1 G/DL
LDLC SERPL-MCNC: 129 MG/DL
MCHC RBC-ENTMCNC: 30.6 PG
MCHC RBC-ENTMCNC: 31.6 G/DL
MCV RBC AUTO: 96.7 FL
NONHDLC SERPL-MCNC: 151 MG/DL
PLATELET # BLD AUTO: 251 K/UL
POTASSIUM SERPL-SCNC: 4.2 MMOL/L
PROT SERPL-MCNC: 6.3 G/DL
RBC # BLD: 4.28 M/UL
RBC # FLD: 15.1 %
SODIUM SERPL-SCNC: 143 MMOL/L
TRIGL SERPL-MCNC: 123 MG/DL
URATE SERPL-MCNC: 5.4 MG/DL
WBC # FLD AUTO: 7.22 K/UL

## 2025-05-15 PROCEDURE — 99214 OFFICE O/P EST MOD 30 MIN: CPT | Mod: 93

## 2025-05-15 PROCEDURE — G2211 COMPLEX E/M VISIT ADD ON: CPT | Mod: 93

## 2025-05-19 ENCOUNTER — RX RENEWAL (OUTPATIENT)
Age: 89
End: 2025-05-19

## 2025-05-20 RX ORDER — MELOXICAM 7.5 MG/1
7.5 TABLET ORAL TWICE DAILY
Qty: 28 | Refills: 0 | Status: ACTIVE | COMMUNITY
Start: 2025-05-20 | End: 1900-01-01

## 2025-05-22 ENCOUNTER — RX RENEWAL (OUTPATIENT)
Age: 89
End: 2025-05-22

## 2025-06-11 ENCOUNTER — APPOINTMENT (OUTPATIENT)
Dept: INTERNAL MEDICINE | Facility: CLINIC | Age: 89
End: 2025-06-11
Payer: MEDICARE

## 2025-06-11 VITALS
DIASTOLIC BLOOD PRESSURE: 60 MMHG | TEMPERATURE: 98.1 F | HEART RATE: 94 BPM | SYSTOLIC BLOOD PRESSURE: 110 MMHG | OXYGEN SATURATION: 98 %

## 2025-06-11 PROCEDURE — 99214 OFFICE O/P EST MOD 30 MIN: CPT

## 2025-06-11 PROCEDURE — G2211 COMPLEX E/M VISIT ADD ON: CPT

## 2025-06-11 RX ORDER — AZITHROMYCIN 250 MG/1
250 TABLET, FILM COATED ORAL
Qty: 1 | Refills: 0 | Status: ACTIVE | COMMUNITY
Start: 2025-06-11 | End: 1900-01-01

## 2025-08-13 ENCOUNTER — APPOINTMENT (OUTPATIENT)
Dept: INTERNAL MEDICINE | Facility: CLINIC | Age: 89
End: 2025-08-13

## 2025-08-14 ENCOUNTER — APPOINTMENT (OUTPATIENT)
Dept: INTERNAL MEDICINE | Facility: CLINIC | Age: 89
End: 2025-08-14
Payer: MEDICARE

## 2025-08-14 DIAGNOSIS — R60.9 EDEMA, UNSPECIFIED: ICD-10-CM

## 2025-08-14 PROCEDURE — 99213 OFFICE O/P EST LOW 20 MIN: CPT | Mod: 2W

## 2025-08-14 PROCEDURE — G2211 COMPLEX E/M VISIT ADD ON: CPT | Mod: 2W

## 2025-09-03 ENCOUNTER — APPOINTMENT (OUTPATIENT)
Dept: INTERNAL MEDICINE | Facility: CLINIC | Age: 89
End: 2025-09-03
Payer: MEDICARE

## 2025-09-03 VITALS
TEMPERATURE: 97.4 F | SYSTOLIC BLOOD PRESSURE: 126 MMHG | OXYGEN SATURATION: 97 % | HEIGHT: 62 IN | BODY MASS INDEX: 44.16 KG/M2 | DIASTOLIC BLOOD PRESSURE: 86 MMHG | HEART RATE: 91 BPM | WEIGHT: 240 LBS

## 2025-09-03 DIAGNOSIS — E78.5 HYPERLIPIDEMIA, UNSPECIFIED: ICD-10-CM

## 2025-09-03 DIAGNOSIS — K59.09 OTHER CONSTIPATION: ICD-10-CM

## 2025-09-03 DIAGNOSIS — E66.01 MORBID (SEVERE) OBESITY DUE TO EXCESS CALORIES: ICD-10-CM

## 2025-09-03 DIAGNOSIS — R60.9 EDEMA, UNSPECIFIED: ICD-10-CM

## 2025-09-03 DIAGNOSIS — R73.03 PREDIABETES.: ICD-10-CM

## 2025-09-03 DIAGNOSIS — M10.9 GOUT, UNSPECIFIED: ICD-10-CM

## 2025-09-03 DIAGNOSIS — M19.90 UNSPECIFIED OSTEOARTHRITIS, UNSPECIFIED SITE: ICD-10-CM

## 2025-09-03 PROCEDURE — 36415 COLL VENOUS BLD VENIPUNCTURE: CPT

## 2025-09-03 PROCEDURE — G2211 COMPLEX E/M VISIT ADD ON: CPT

## 2025-09-03 PROCEDURE — 99214 OFFICE O/P EST MOD 30 MIN: CPT

## 2025-09-04 LAB
25(OH)D3 SERPL-MCNC: 33 NG/ML
ALBUMIN SERPL ELPH-MCNC: 4.2 G/DL
ALBUMIN SERPL ELPH-MCNC: 4.2 G/DL
ALP BLD-CCNC: 113 U/L
ALP BLD-CCNC: 116 U/L
ALT SERPL-CCNC: 23 U/L
ALT SERPL-CCNC: 24 U/L
ANION GAP SERPL CALC-SCNC: 15 MMOL/L
ANION GAP SERPL CALC-SCNC: 16 MMOL/L
AST SERPL-CCNC: 28 U/L
AST SERPL-CCNC: 33 U/L
BASOPHILS # BLD AUTO: 0.11 K/UL
BASOPHILS NFR BLD AUTO: 1.2 %
BILIRUB SERPL-MCNC: 0.4 MG/DL
BILIRUB SERPL-MCNC: 0.4 MG/DL
BUN SERPL-MCNC: 26 MG/DL
BUN SERPL-MCNC: 28 MG/DL
CALCIUM SERPL-MCNC: 9.8 MG/DL
CALCIUM SERPL-MCNC: 9.8 MG/DL
CHLORIDE SERPL-SCNC: 101 MMOL/L
CHLORIDE SERPL-SCNC: 102 MMOL/L
CHOLEST SERPL-MCNC: 227 MG/DL
CHOLEST SERPL-MCNC: 233 MG/DL
CO2 SERPL-SCNC: 22 MMOL/L
CO2 SERPL-SCNC: 22 MMOL/L
CREAT SERPL-MCNC: 0.8 MG/DL
CREAT SERPL-MCNC: 0.8 MG/DL
EGFRCR SERPLBLD CKD-EPI 2021: 70 ML/MIN/1.73M2
EGFRCR SERPLBLD CKD-EPI 2021: 70 ML/MIN/1.73M2
EOSINOPHIL # BLD AUTO: 0.18 K/UL
EOSINOPHIL NFR BLD AUTO: 2 %
ESTIMATED AVERAGE GLUCOSE: 123 MG/DL
GLUCOSE SERPL-MCNC: 105 MG/DL
GLUCOSE SERPL-MCNC: 106 MG/DL
HBA1C MFR BLD HPLC: 5.9 %
HCT VFR BLD CALC: 41.3 %
HDLC SERPL-MCNC: 67 MG/DL
HDLC SERPL-MCNC: 69 MG/DL
HGB BLD-MCNC: 13.5 G/DL
IMM GRANULOCYTES NFR BLD AUTO: 0.6 %
LDLC SERPL-MCNC: 139 MG/DL
LDLC SERPL-MCNC: 144 MG/DL
LYMPHOCYTES # BLD AUTO: 1.42 K/UL
LYMPHOCYTES NFR BLD AUTO: 15.7 %
MAN DIFF?: NORMAL
MCHC RBC-ENTMCNC: 31.6 PG
MCHC RBC-ENTMCNC: 32.7 G/DL
MCV RBC AUTO: 96.7 FL
MONOCYTES # BLD AUTO: 0.86 K/UL
MONOCYTES NFR BLD AUTO: 9.5 %
NEUTROPHILS # BLD AUTO: 6.43 K/UL
NEUTROPHILS NFR BLD AUTO: 71 %
NONHDLC SERPL-MCNC: 159 MG/DL
NONHDLC SERPL-MCNC: 164 MG/DL
PLATELET # BLD AUTO: 291 K/UL
POTASSIUM SERPL-SCNC: 3.8 MMOL/L
POTASSIUM SERPL-SCNC: 3.9 MMOL/L
PROT SERPL-MCNC: 6.6 G/DL
PROT SERPL-MCNC: 6.9 G/DL
RBC # BLD: 4.27 M/UL
RBC # FLD: 15 %
SODIUM SERPL-SCNC: 138 MMOL/L
SODIUM SERPL-SCNC: 139 MMOL/L
T4 FREE SERPL-MCNC: 1.2 NG/DL
TRIGL SERPL-MCNC: 113 MG/DL
TRIGL SERPL-MCNC: 114 MG/DL
TSH SERPL-ACNC: 1.93 UIU/ML
URATE SERPL-MCNC: 5.9 MG/DL
URATE SERPL-MCNC: 6.1 MG/DL
VIT B12 SERPL-MCNC: 510 PG/ML
WBC # FLD AUTO: 9.05 K/UL